# Patient Record
Sex: FEMALE | Race: WHITE | Employment: UNEMPLOYED | URBAN - NONMETROPOLITAN AREA
[De-identification: names, ages, dates, MRNs, and addresses within clinical notes are randomized per-mention and may not be internally consistent; named-entity substitution may affect disease eponyms.]

---

## 2017-05-04 ENCOUNTER — HOSPITAL ENCOUNTER (OUTPATIENT)
Dept: WOMENS IMAGING | Age: 42
Discharge: HOME | End: 2017-05-04
Payer: COMMERCIAL

## 2017-05-04 DIAGNOSIS — Z12.31 ENCOUNTER FOR SCREENING MAMMOGRAM FOR MALIGNANT NEOPLASM OF BREAST: ICD-10-CM

## 2017-05-04 PROCEDURE — 77063 BREAST TOMOSYNTHESIS BI: CPT

## 2020-08-17 ENCOUNTER — OFFICE VISIT (OUTPATIENT)
Dept: INTERNAL MEDICINE | Age: 45
End: 2020-08-17
Payer: COMMERCIAL

## 2020-08-17 VITALS
SYSTOLIC BLOOD PRESSURE: 138 MMHG | WEIGHT: 201 LBS | BODY MASS INDEX: 32.3 KG/M2 | OXYGEN SATURATION: 98 % | HEART RATE: 114 BPM | RESPIRATION RATE: 18 BRPM | HEIGHT: 66 IN | DIASTOLIC BLOOD PRESSURE: 88 MMHG

## 2020-08-17 PROCEDURE — 99205 OFFICE O/P NEW HI 60 MIN: CPT | Performed by: INTERNAL MEDICINE

## 2020-08-17 RX ORDER — VENLAFAXINE HYDROCHLORIDE 150 MG/1
150 CAPSULE, EXTENDED RELEASE ORAL DAILY
COMMUNITY
End: 2020-10-27 | Stop reason: SDUPTHER

## 2020-08-17 RX ORDER — LEVOTHYROXINE SODIUM 0.03 MG/1
25 TABLET ORAL DAILY
COMMUNITY
End: 2021-08-05 | Stop reason: CLARIF

## 2020-08-17 RX ORDER — LEVOTHYROXINE SODIUM 0.2 MG/1
200 TABLET ORAL DAILY
COMMUNITY
End: 2021-08-05 | Stop reason: CLARIF

## 2020-08-17 ASSESSMENT — PATIENT HEALTH QUESTIONNAIRE - PHQ9
2. FEELING DOWN, DEPRESSED OR HOPELESS: 0
SUM OF ALL RESPONSES TO PHQ QUESTIONS 1-9: 0
SUM OF ALL RESPONSES TO PHQ9 QUESTIONS 1 & 2: 0
SUM OF ALL RESPONSES TO PHQ QUESTIONS 1-9: 0
1. LITTLE INTEREST OR PLEASURE IN DOING THINGS: 0

## 2020-08-17 ASSESSMENT — ENCOUNTER SYMPTOMS
SORE THROAT: 0
ABDOMINAL PAIN: 0
CONSTIPATION: 0
COUGH: 0
WHEEZING: 0
CHEST TIGHTNESS: 0

## 2020-08-17 NOTE — PROGRESS NOTES
Tech     Employer: [address] Financial resource strain: Not on file    Food insecurity     Worry: Not on file     Inability: Not on file    Transportation needs     Medical: Not on file     Non-medical: Not on file   Tobacco Use    Smoking status: Never Smoker    Smokeless tobacco: Never Used   Substance and Sexual Activity    Alcohol use: Not Currently    Drug use: Never    Sexual activity: Yes   Lifestyle    Physical activity     Days per week: Not on file     Minutes per session: Not on file    Stress: Not on file   Relationships    Social connections     Talks on phone: Not on file     Gets together: Not on file     Attends [employer] service: Not on file     Active member of club or organization: Not on file     Attends meetings of clubs or organizations: Not on file     Relationship status: Not on file    Intimate partner violence     Fear of current or ex partner: Not on file     Emotionally abused: Not on file     Physically abused: Not on file     Forced sexual activity: Not on file   Other Topics Concern    Not on file   Social History Narrative    Not on file     Family History   Problem Relation Age of Onset    Atrial Fibrillation Mother     Stroke Mother     Diabetes Father     Prostate Cancer Father           Past Surgical History:   Procedure Laterality Date     SECTION  2008    FINGER SURGERY      Thumb surgery  (Broke playing softball)    KNEE SURGERY      multiple scopes left knee    THYROIDECTOMY, PARTIAL  2001    Nithin Sandoval benign mass         Lab Review   No visits with results within 2 Month(s) from this visit.    Latest known visit with results is:   Hospital Outpatient Visit on 2013   Component Date Value    WBC 2013 5.82     RBC 2013 4.37     Hemoglobin 2013 12.1     Hematocrit 2013 37.3     MCV 2013 85.4     MCH 2013 27.7     MCHC 2013 32.4*    RDW 2013 14.9*    Platelets 06/26/2013 221     MPV 06/26/2013 10.2     Neutrophils Absolute 06/26/2013 2.32     Lymphocytes 06/26/2013 2.75     Monocytes 06/26/2013 0.63     Eosinophils % 06/26/2013 0.12     Basophils 06/26/2013 0*    Neutrophils % 06/26/2013 39.8*    Lymphocytes 06/26/2013 47.3*    Monocytes % 06/26/2013 10.8*    Eosinophils % 06/26/2013 2.1     Basophils % 06/26/2013 0     Calcium 06/26/2013 9.4     Glucose 06/26/2013 92     CREATININE 06/26/2013 0.8     Gfr Calculated 06/26/2013 85     BUN 06/26/2013 11     Total Protein 06/26/2013 6.4     Alb 06/26/2013 4.3     Total Bilirubin 06/26/2013 0.5     Alkaline Phosphatase 06/26/2013 61     AST 06/26/2013 21     Sodium 06/26/2013 144     Potassium 06/26/2013 4.9     Chloride 06/26/2013 111     CO2 06/26/2013 28     ALT 06/26/2013 16     Anion Gap 06/26/2013 5*    T4 Free 06/26/2013 0.9     TSH 06/26/2013 0.38          Review of Systems   Constitutional: Positive for fatigue. Negative for chills and fever. HENT: Negative for congestion, ear pain, nosebleeds, postnasal drip and sore throat. Respiratory: Negative for cough, chest tightness and wheezing. Cardiovascular: Negative for chest pain, palpitations and leg swelling. Gastrointestinal: Negative for abdominal pain and constipation. Genitourinary: Negative for dysuria and urgency. Musculoskeletal: Positive for arthralgias. Skin: Negative for rash. Neurological: Negative for dizziness and headaches. Psychiatric/Behavioral: Positive for sleep disturbance. The patient is nervous/anxious. Vitals:    08/17/20 1055   BP: 138/88   Site: Left Upper Arm   Position: Sitting   Cuff Size: Large Adult   Pulse: 114   Resp: 18   SpO2: 98%   Weight: 201 lb (91.2 kg)   Height: 5' 6\" (1.676 m)      Wt Readings from Last 3 Encounters:   08/17/20 201 lb (91.2 kg)   Body mass index is 32.44 kg/m².   BP Readings from Last 3 Encounters:   08/17/20 138/88       Physical Exam  Constitutional: Appearance: She is well-developed. HENT:      Right Ear: External ear normal.      Left Ear: External ear normal.      Mouth/Throat:      Pharynx: No oropharyngeal exudate. Eyes:      Conjunctiva/sclera: Conjunctivae normal.      Pupils: Pupils are equal, round, and reactive to light. Neck:      Musculoskeletal: Neck supple. Thyroid: No thyromegaly. Vascular: No JVD. Cardiovascular:      Rate and Rhythm: Normal rate. Heart sounds: Normal heart sounds. No murmur. Pulmonary:      Effort: No respiratory distress. Breath sounds: Normal breath sounds. No wheezing or rales. Chest:      Chest wall: No tenderness. Abdominal:      General: Bowel sounds are normal.      Palpations: Abdomen is soft. Lymphadenopathy:      Cervical: No cervical adenopathy. Skin:     General: Skin is warm. Findings: No rash. Neurological:      Mental Status: She is oriented to person, place, and time. ASSESSMENT/PLAN      Postsurgical hypothyroidism  She states that her thyroid medication was decreased in the fall 2019 from 275 to 225 mcg  She states that ever since then she has gained more weigh t and is feeling extremely tired      -     TSH without Reflex; Future  -     T4, Free; Future    Other fatigue  Weight gain last one year-       Obtain lab    CBC Auto Differential; Future  -     Comprehensive Metabolic Panel; Future  -     Hemoglobin A1C; Future  -     Lipid Panel; Future  -     Urinalysis; Future  -     TSH without Reflex; Future  -     T4, Free; Future  -     Vitamin D 25 Hydroxy; Future    Exogenous obesity    Pt was given approximately 5 minutes of counseling about diet and exercise including education on what calories are, where calories come from, the need for portion control,following lower carbohydrate dietary regimen and healthy snacks along side an active lifestyle with supplementary exercise of approx 30 minutes a week, 5 days a week of exercise for weight loss.   The patient voiced increased understanding of the topics discussed. Generalized anxiety disorder    Refill effexor    Visit for screening mammogram  -     LUDY DIGITAL SCREEN W OR WO CAD BILATERAL; Future    Discussed with patient  Has not had Pap smear since 2015  Always normal Pap smears  Her Pap is due  She will schedule her appointment for this with me    Orders Placed This Encounter   Procedures    LUDY DIGITAL SCREEN W OR WO CAD BILATERAL    CBC Auto Differential    Comprehensive Metabolic Panel    Hemoglobin A1C    Lipid Panel    Urinalysis    TSH without Reflex    T4, Free    Vitamin D 25 Hydroxy     New Prescriptions    No medications on file      There are no Patient Instructions on file for this visit. No follow-ups on file. EMR Dragon/transcription disclaimer:Significant part of this  encounter note is electronic transcription/translation of spoken language to printed text. The electronic translation of spoken language may beerroneous, or at times, nonsensical words or phrases may be inadvertently transcribed.  Although I have reviewed the note for such errors, some may still exist.

## 2020-08-20 DIAGNOSIS — R53.83 OTHER FATIGUE: ICD-10-CM

## 2020-08-20 DIAGNOSIS — E66.09 EXOGENOUS OBESITY: ICD-10-CM

## 2020-08-20 DIAGNOSIS — F41.1 GENERALIZED ANXIETY DISORDER: ICD-10-CM

## 2020-08-20 DIAGNOSIS — E89.0 POSTSURGICAL HYPOTHYROIDISM: ICD-10-CM

## 2020-08-20 LAB
ALBUMIN SERPL-MCNC: 4.1 G/DL (ref 3.5–5.2)
ALP BLD-CCNC: 82 U/L (ref 35–104)
ALT SERPL-CCNC: 13 U/L (ref 5–33)
ANION GAP SERPL CALCULATED.3IONS-SCNC: 14 MMOL/L (ref 7–19)
AST SERPL-CCNC: 15 U/L (ref 5–32)
BASOPHILS ABSOLUTE: 0 K/UL (ref 0–0.2)
BASOPHILS RELATIVE PERCENT: 0 % (ref 0–1)
BILIRUB SERPL-MCNC: 0.3 MG/DL (ref 0.2–1.2)
BILIRUBIN URINE: NEGATIVE
BLOOD, URINE: NEGATIVE
BUN BLDV-MCNC: 13 MG/DL (ref 6–20)
CALCIUM SERPL-MCNC: 8.9 MG/DL (ref 8.6–10)
CHLORIDE BLD-SCNC: 105 MMOL/L (ref 98–111)
CHOLESTEROL, TOTAL: 165 MG/DL (ref 160–199)
CLARITY: CLEAR
CO2: 24 MMOL/L (ref 22–29)
COLOR: YELLOW
CREAT SERPL-MCNC: 0.6 MG/DL (ref 0.5–0.9)
EOSINOPHILS ABSOLUTE: 0 K/UL (ref 0–0.6)
EOSINOPHILS RELATIVE PERCENT: 0 % (ref 0–5)
GFR AFRICAN AMERICAN: >59
GFR NON-AFRICAN AMERICAN: >60
GLUCOSE BLD-MCNC: 110 MG/DL (ref 74–109)
GLUCOSE URINE: NEGATIVE MG/DL
HBA1C MFR BLD: 5.6 % (ref 4–6)
HCT VFR BLD CALC: 42.4 % (ref 37–47)
HDLC SERPL-MCNC: 50 MG/DL (ref 65–121)
HEMOGLOBIN: 12.9 G/DL (ref 12–16)
IMMATURE GRANULOCYTES #: 0 K/UL
KETONES, URINE: NEGATIVE MG/DL
LDL CHOLESTEROL CALCULATED: 84 MG/DL
LEUKOCYTE ESTERASE, URINE: NEGATIVE
LYMPHOCYTES ABSOLUTE: 3 K/UL (ref 1.1–4.5)
LYMPHOCYTES RELATIVE PERCENT: 44 % (ref 20–40)
MCH RBC QN AUTO: 25 PG (ref 27–31)
MCHC RBC AUTO-ENTMCNC: 30.4 G/DL (ref 33–37)
MCV RBC AUTO: 82.3 FL (ref 81–99)
MONOCYTES ABSOLUTE: 0.6 K/UL (ref 0–0.9)
MONOCYTES RELATIVE PERCENT: 9.3 % (ref 0–10)
NEUTROPHILS ABSOLUTE: 3.1 K/UL (ref 1.5–7.5)
NEUTROPHILS RELATIVE PERCENT: 46.4 % (ref 50–65)
NITRITE, URINE: NEGATIVE
PDW BLD-RTO: 16.1 % (ref 11.5–14.5)
PH UA: 5.5 (ref 5–8)
PLATELET # BLD: 240 K/UL (ref 130–400)
PMV BLD AUTO: 9.8 FL (ref 9.4–12.3)
POTASSIUM SERPL-SCNC: 4 MMOL/L (ref 3.5–5)
PROTEIN UA: NEGATIVE MG/DL
RBC # BLD: 5.15 M/UL (ref 4.2–5.4)
SODIUM BLD-SCNC: 143 MMOL/L (ref 136–145)
SPECIFIC GRAVITY UA: 1.02 (ref 1–1.03)
T4 FREE: 1.39 NG/DL (ref 0.93–1.7)
TOTAL PROTEIN: 6.6 G/DL (ref 6.6–8.7)
TRIGL SERPL-MCNC: 154 MG/DL (ref 0–149)
TSH SERPL DL<=0.05 MIU/L-ACNC: 0.01 UIU/ML (ref 0.27–4.2)
UROBILINOGEN, URINE: 0.2 E.U./DL
VITAMIN D 25-HYDROXY: 42.7 NG/ML
WBC # BLD: 6.8 K/UL (ref 4.8–10.8)

## 2020-09-21 ENCOUNTER — OFFICE VISIT (OUTPATIENT)
Dept: INTERNAL MEDICINE | Age: 45
End: 2020-09-21
Payer: COMMERCIAL

## 2020-09-21 VITALS
RESPIRATION RATE: 18 BRPM | HEIGHT: 66 IN | OXYGEN SATURATION: 98 % | DIASTOLIC BLOOD PRESSURE: 78 MMHG | HEART RATE: 88 BPM | WEIGHT: 195 LBS | SYSTOLIC BLOOD PRESSURE: 100 MMHG | BODY MASS INDEX: 31.34 KG/M2

## 2020-09-21 PROBLEM — F41.1 GENERALIZED ANXIETY DISORDER: Status: ACTIVE | Noted: 2020-09-21

## 2020-09-21 PROBLEM — E89.0 POSTSURGICAL HYPOTHYROIDISM: Status: ACTIVE | Noted: 2020-09-21

## 2020-09-21 PROCEDURE — 99213 OFFICE O/P EST LOW 20 MIN: CPT | Performed by: INTERNAL MEDICINE

## 2020-09-21 RX ORDER — TOPIRAMATE 50 MG/1
50 TABLET, FILM COATED ORAL 2 TIMES DAILY
COMMUNITY
End: 2021-01-25 | Stop reason: SDUPTHER

## 2020-09-21 RX ORDER — TOPIRAMATE 100 MG/1
100 TABLET, FILM COATED ORAL 2 TIMES DAILY
COMMUNITY
End: 2021-01-25 | Stop reason: SDUPTHER

## 2020-09-21 ASSESSMENT — PATIENT HEALTH QUESTIONNAIRE - PHQ9
SUM OF ALL RESPONSES TO PHQ QUESTIONS 1-9: 0
SUM OF ALL RESPONSES TO PHQ9 QUESTIONS 1 & 2: 0
SUM OF ALL RESPONSES TO PHQ QUESTIONS 1-9: 0
2. FEELING DOWN, DEPRESSED OR HOPELESS: 0
1. LITTLE INTEREST OR PLEASURE IN DOING THINGS: 0

## 2020-09-21 ASSESSMENT — ENCOUNTER SYMPTOMS
CONSTIPATION: 0
ABDOMINAL PAIN: 0
WHEEZING: 0
SORE THROAT: 0
COUGH: 0
CHEST TIGHTNESS: 0

## 2020-09-21 NOTE — PROGRESS NOTES
Currently      Family History   Problem Relation Age of Onset    Atrial Fibrillation Mother     Stroke Mother     Diabetes Father     Prostate Cancer Father        Review of Systems   Constitutional: Positive for fatigue. Negative for chills and fever. HENT: Negative for congestion, ear pain, nosebleeds, postnasal drip and sore throat. Respiratory: Negative for cough, chest tightness and wheezing. Cardiovascular: Negative for chest pain, palpitations and leg swelling. Gastrointestinal: Negative for abdominal pain and constipation. Genitourinary: Negative for dysuria and urgency. Musculoskeletal: Negative. Negative for arthralgias. Skin: Negative for rash. Neurological: Negative for dizziness and headaches. Psychiatric/Behavioral: Negative. Vitals:    09/21/20 1425   BP: 100/78   Site: Left Upper Arm   Position: Sitting   Cuff Size: Large Adult   Pulse: 88   Resp: 18   SpO2: 98%   Weight: 195 lb (88.5 kg)   Height: 5' 6\" (1.676 m)     Body mass index is 31.47 kg/m². Physical Exam  Constitutional:       General: She is not in acute distress. Appearance: She is well-developed. She is not diaphoretic. HENT:      Head: Normocephalic and atraumatic. Nose: Nose normal.   Eyes:      General: No scleral icterus. Right eye: No discharge. Left eye: No discharge. Conjunctiva/sclera: Conjunctivae normal.      Pupils: Pupils are equal, round, and reactive to light. Neck:      Musculoskeletal: Normal range of motion. Thyroid: No thyromegaly. Vascular: No JVD. Cardiovascular:      Rate and Rhythm: Normal rate and regular rhythm. Heart sounds: No murmur. Pulmonary:      Breath sounds: Normal breath sounds. No wheezing or rales. Chest:      Chest wall: No tenderness. Abdominal:      General: Bowel sounds are normal. There is no distension. Tenderness: There is no guarding. Lymphadenopathy:      Cervical: No cervical adenopathy.    Skin: General: Skin is dry. Findings: No erythema or rash. Neurological:      Mental Status: She is oriented to person, place, and time. Cranial Nerves: No cranial nerve deficit. Coordination: Coordination normal.      Deep Tendon Reflexes: Reflexes are normal and symmetric. Psychiatric:         Behavior: Behavior normal.         Thought Content:  Thought content normal.         Judgment: Judgment normal.     Pelvic exam today  Pap smear done  Ovaries nonpalpable  No palpable masses    Lab Review   Orders Only on 08/20/2020   Component Date Value    Vit D, 25-Hydroxy 08/20/2020 42.7     T4 Free 08/20/2020 1.39     TSH 08/20/2020 0.009*    Color, UA 08/20/2020 YELLOW     Clarity, UA 08/20/2020 Clear     Glucose, Ur 08/20/2020 Negative     Bilirubin Urine 08/20/2020 Negative     Ketones, Urine 08/20/2020 Negative     Specific Gravity, UA 08/20/2020 1.024     Blood, Urine 08/20/2020 Negative     pH, UA 08/20/2020 5.5     Protein, UA 08/20/2020 Negative     Urobilinogen, Urine 08/20/2020 0.2     Nitrite, Urine 08/20/2020 Negative     Leukocyte Esterase, Urine 08/20/2020 Negative     Cholesterol, Total 08/20/2020 165     Triglycerides 08/20/2020 154*    HDL 08/20/2020 50*    LDL Calculated 08/20/2020 84     Hemoglobin A1C 08/20/2020 5.6     Sodium 08/20/2020 143     Potassium 08/20/2020 4.0     Chloride 08/20/2020 105     CO2 08/20/2020 24     Anion Gap 08/20/2020 14     Glucose 08/20/2020 110*    BUN 08/20/2020 13     CREATININE 08/20/2020 0.6     GFR Non- 08/20/2020 >60     GFR  08/20/2020 >59     Calcium 08/20/2020 8.9     Total Protein 08/20/2020 6.6     Alb 08/20/2020 4.1     Total Bilirubin 08/20/2020 0.3     Alkaline Phosphatase 08/20/2020 82     ALT 08/20/2020 13     AST 08/20/2020 15     WBC 08/20/2020 6.8     RBC 08/20/2020 5.15     Hemoglobin 08/20/2020 12.9     Hematocrit 08/20/2020 42.4     MCV 08/20/2020 82.3     MCH 08/20/2020 25.0*    MCHC 08/20/2020 30.4*    RDW 08/20/2020 16.1*    Platelets 45/11/5408 240     MPV 08/20/2020 9.8     Neutrophils % 08/20/2020 46.4*    Lymphocytes % 08/20/2020 44.0*    Monocytes % 08/20/2020 9.3     Eosinophils % 08/20/2020 0.0     Basophils % 08/20/2020 0.0     Neutrophils Absolute 08/20/2020 3.1     Immature Granulocytes # 08/20/2020 0.0     Lymphocytes Absolute 08/20/2020 3.0     Monocytes Absolute 08/20/2020 0.60     Eosinophils Absolute 08/20/2020 0.00     Basophils Absolute 08/20/2020 0.00            ASSESSMENT/PLAN:      Postsurgical hypothyroidism  T4 Free 08/20/2020 1.39    TSH 08/20/2020 0.009*   Initially, when patient presented she had concerns that her thyroid medication dose is too low  On recent lab testing her TSH is quite significantly suppressed even on a lower dose of Synthroid that was decreased about 8 months ago  This time we can continue Synthroid 225 mcg daily and plan to repeat TSH and free T4 again in January 2021    Pap smear for cervical cancer screening today  Cervical cancer screening  -     PAP SMEAR    Generalized anxiety disorder  Overall has been doing well on current Effexor dose  Continue current plans    Obesity  Since last appointment has started diet and exercise program, low-carb diet and has lost 6 pounds    Elevated fasting blood sugar over 100 on fasting blood work  Hemoglobin A1c 5.6  She started diet and exercise program    Vitamin D level is low normal end of the summer  Currently she is taking multivitamin, continue same  Repeat January 2021      Orders Placed This Encounter   Procedures    PAP SMEAR    HIV Screen    Hemoglobin A1C    TSH without Reflex    T4, Free    Vitamin D 25 Hydroxy     New Prescriptions    No medications on file         Return in about 4 months (around 1/21/2021) for Medication check. There are no Patient Instructions on file for this visit.   EMR Dragon/transcription disclaimer:Significant part of this encounter note is electronic transcription/translationof spoken language to printed text. The electronic translation of spoken language may be erroneous, or at times, nonsensical words or phrases may be inadvertently transcribed.  Although I have reviewed the note for sucherrors, some may still exist.

## 2020-11-05 DIAGNOSIS — B02.9 HERPES ZOSTER WITHOUT COMPLICATION: Primary | ICD-10-CM

## 2020-11-05 RX ORDER — VALACYCLOVIR HYDROCHLORIDE 1 G/1
1000 TABLET, FILM COATED ORAL 3 TIMES DAILY
Qty: 21 TABLET | Refills: 0 | Status: SHIPPED | OUTPATIENT
Start: 2020-11-05 | End: 2020-11-12

## 2020-11-05 RX ORDER — GABAPENTIN 100 MG/1
100 CAPSULE ORAL 3 TIMES DAILY
Qty: 30 CAPSULE | Refills: 0 | Status: SHIPPED | OUTPATIENT
Start: 2020-11-05

## 2021-01-22 DIAGNOSIS — E55.9 VITAMIN D DEFICIENCY: ICD-10-CM

## 2021-01-22 DIAGNOSIS — Z12.4 CERVICAL CANCER SCREENING: ICD-10-CM

## 2021-01-22 DIAGNOSIS — F41.1 GENERALIZED ANXIETY DISORDER: ICD-10-CM

## 2021-01-22 DIAGNOSIS — E89.0 POSTSURGICAL HYPOTHYROIDISM: ICD-10-CM

## 2021-01-22 DIAGNOSIS — Z11.4 SCREENING FOR HIV (HUMAN IMMUNODEFICIENCY VIRUS): ICD-10-CM

## 2021-01-22 LAB
HBA1C MFR BLD: 5.6 % (ref 4–6)
HIV-1 P24 AG: NORMAL
RAPID HIV 1&2: NORMAL
T4 FREE: 0.9 NG/DL (ref 0.93–1.7)
TSH SERPL DL<=0.05 MIU/L-ACNC: 0.22 UIU/ML (ref 0.27–4.2)
VITAMIN D 25-HYDROXY: 31.7 NG/ML

## 2021-01-25 ENCOUNTER — OFFICE VISIT (OUTPATIENT)
Dept: INTERNAL MEDICINE | Age: 46
End: 2021-01-25
Payer: COMMERCIAL

## 2021-01-25 VITALS
HEART RATE: 103 BPM | WEIGHT: 193 LBS | RESPIRATION RATE: 18 BRPM | SYSTOLIC BLOOD PRESSURE: 100 MMHG | DIASTOLIC BLOOD PRESSURE: 70 MMHG | BODY MASS INDEX: 31.02 KG/M2 | OXYGEN SATURATION: 96 % | HEIGHT: 66 IN

## 2021-01-25 DIAGNOSIS — E89.0 POSTSURGICAL HYPOTHYROIDISM: Primary | ICD-10-CM

## 2021-01-25 DIAGNOSIS — Z11.59 NEED FOR HEPATITIS C SCREENING TEST: ICD-10-CM

## 2021-01-25 DIAGNOSIS — F41.1 GENERALIZED ANXIETY DISORDER: ICD-10-CM

## 2021-01-25 DIAGNOSIS — E66.09 EXOGENOUS OBESITY: ICD-10-CM

## 2021-01-25 DIAGNOSIS — E55.9 VITAMIN D DEFICIENCY: ICD-10-CM

## 2021-01-25 PROCEDURE — 99214 OFFICE O/P EST MOD 30 MIN: CPT | Performed by: INTERNAL MEDICINE

## 2021-01-25 RX ORDER — LEVOTHYROXINE SODIUM 25 MCG
25 TABLET ORAL DAILY
Qty: 90 TABLET | Refills: 1 | Status: SHIPPED | OUTPATIENT
Start: 2021-01-25 | End: 2021-01-25 | Stop reason: SDUPTHER

## 2021-01-25 RX ORDER — LEVOTHYROXINE SODIUM 200 MCG
200 TABLET ORAL DAILY
Qty: 90 TABLET | Refills: 1 | Status: SHIPPED | OUTPATIENT
Start: 2021-01-25 | End: 2021-08-05 | Stop reason: SDUPTHER

## 2021-01-25 RX ORDER — TOPIRAMATE 50 MG/1
50 TABLET, FILM COATED ORAL 2 TIMES DAILY
Qty: 180 TABLET | Refills: 1 | Status: SHIPPED | OUTPATIENT
Start: 2021-01-25 | End: 2021-08-05 | Stop reason: SDUPTHER

## 2021-01-25 RX ORDER — LEVOTHYROXINE SODIUM 25 MCG
37.5 TABLET ORAL DAILY
Qty: 135 TABLET | Refills: 1 | Status: SHIPPED | OUTPATIENT
Start: 2021-01-25 | End: 2021-08-05 | Stop reason: SDUPTHER

## 2021-01-25 RX ORDER — TOPIRAMATE 100 MG/1
100 TABLET, FILM COATED ORAL 2 TIMES DAILY
Qty: 180 TABLET | Refills: 1 | Status: SHIPPED | OUTPATIENT
Start: 2021-01-25 | End: 2021-08-05 | Stop reason: SDUPTHER

## 2021-01-25 RX ORDER — RIZATRIPTAN BENZOATE 10 MG/1
10 TABLET ORAL
Qty: 9 TABLET | Refills: 5 | Status: SHIPPED | OUTPATIENT
Start: 2021-01-25 | End: 2021-08-05 | Stop reason: CLARIF

## 2021-01-25 RX ORDER — VENLAFAXINE HYDROCHLORIDE 150 MG/1
150 CAPSULE, EXTENDED RELEASE ORAL DAILY
Qty: 90 CAPSULE | Refills: 1 | Status: SHIPPED | OUTPATIENT
Start: 2021-01-25 | End: 2021-08-05 | Stop reason: SDUPTHER

## 2021-01-25 SDOH — ECONOMIC STABILITY: TRANSPORTATION INSECURITY
IN THE PAST 12 MONTHS, HAS THE LACK OF TRANSPORTATION KEPT YOU FROM MEDICAL APPOINTMENTS OR FROM GETTING MEDICATIONS?: NO

## 2021-01-25 SDOH — ECONOMIC STABILITY: INCOME INSECURITY: HOW HARD IS IT FOR YOU TO PAY FOR THE VERY BASICS LIKE FOOD, HOUSING, MEDICAL CARE, AND HEATING?: NOT HARD AT ALL

## 2021-01-25 SDOH — ECONOMIC STABILITY: TRANSPORTATION INSECURITY
IN THE PAST 12 MONTHS, HAS LACK OF TRANSPORTATION KEPT YOU FROM MEETINGS, WORK, OR FROM GETTING THINGS NEEDED FOR DAILY LIVING?: NO

## 2021-01-25 SDOH — ECONOMIC STABILITY: FOOD INSECURITY: WITHIN THE PAST 12 MONTHS, YOU WORRIED THAT YOUR FOOD WOULD RUN OUT BEFORE YOU GOT MONEY TO BUY MORE.: NEVER TRUE

## 2021-01-25 ASSESSMENT — PATIENT HEALTH QUESTIONNAIRE - PHQ9
SUM OF ALL RESPONSES TO PHQ QUESTIONS 1-9: 0
SUM OF ALL RESPONSES TO PHQ QUESTIONS 1-9: 0
2. FEELING DOWN, DEPRESSED OR HOPELESS: 0
SUM OF ALL RESPONSES TO PHQ9 QUESTIONS 1 & 2: 0
1. LITTLE INTEREST OR PLEASURE IN DOING THINGS: 0

## 2021-01-25 ASSESSMENT — ENCOUNTER SYMPTOMS
WHEEZING: 0
CONSTIPATION: 0
COUGH: 0
ABDOMINAL PAIN: 0
CHEST TIGHTNESS: 0
SORE THROAT: 0

## 2021-01-25 NOTE — PROGRESS NOTES
Chief Complaint   Patient presents with    Follow-up     4 month     History of presenting illness:  Vincenzo Strauss is a37 y.o. female who presents today for follow up on her chronic medical conditions as noted below. Grzegorz Sharma was new patient for us on 2020    She returns today for following reasons  1. Follow-up regarding anxiety/stress, she takes Effexor  daily  2. Follow-up regarding test results and her hypothyroidism  She is currently taking Synthroid 225 mcg daily  3.   Issues with weight-since her last visit patient has started low-carb diet program   Since 2020 has lost 8 pounds     Patient Active Problem List    Diagnosis Date Noted    Generalized anxiety disorder 2020    Postsurgical hypothyroidism 2020     Past Medical History:   Diagnosis Date    Anxiety     H/O partial thyroidectomy     Nithin Sandoval    Hypothyroidism       Past Surgical History:   Procedure Laterality Date     SECTION      FINGER SURGERY      Thumb surgery  (Broke playing softball)    KNEE SURGERY      multiple scopes left knee    THYROIDECTOMY, PARTIAL      Nithin Sandoval benign mass     Current Outpatient Medications   Medication Sig Dispense Refill    venlafaxine (EFFEXOR XR) 150 MG extended release capsule Take 1 capsule by mouth daily 90 capsule 1    topiramate (TOPAMAX) 50 MG tablet Take 1 tablet by mouth 2 times daily 180 tablet 1    topiramate (TOPAMAX) 100 MG tablet Take 1 tablet by mouth 2 times daily 180 tablet 1    SYNTHROID 200 MCG tablet Take 1 tablet by mouth Daily 90 tablet 1    rizatriptan (MAXALT) 10 MG tablet Take 1 tablet by mouth once as needed for Migraine May repeat in 2 hours if needed 9 tablet 5    SYNTHROID 25 MCG tablet Take 1.5 tablets by mouth Daily 135 tablet 1    levothyroxine (SYNTHROID) 200 MCG tablet Take 200 mcg by mouth Daily      levothyroxine (SYNTHROID) 25 MCG tablet Take 25 mcg by mouth Daily       No current facility-administered medications for this visit. Allergies   Allergen Reactions    Sulfa Antibiotics Rash     Social History     Tobacco Use    Smoking status: Never Smoker    Smokeless tobacco: Never Used   Substance Use Topics    Alcohol use: Not Currently      Family History   Problem Relation Age of Onset    Atrial Fibrillation Mother     Stroke Mother     Diabetes Father     Prostate Cancer Father        Review of Systems   Constitutional: Positive for fatigue. Negative for chills and fever. HENT: Negative for congestion, ear pain, nosebleeds, postnasal drip and sore throat. Respiratory: Negative for cough, chest tightness and wheezing. Cardiovascular: Negative for chest pain, palpitations and leg swelling. Gastrointestinal: Negative for abdominal pain and constipation. Genitourinary: Negative for dysuria and urgency. Musculoskeletal: Positive for arthralgias. Skin: Negative for rash. Neurological: Negative for dizziness and headaches. Psychiatric/Behavioral: Negative. Vitals:    01/25/21 1435   BP: 100/70   Site: Left Upper Arm   Position: Sitting   Cuff Size: Large Adult   Pulse: 103   Resp: 18   SpO2: 96%   Weight: 193 lb (87.5 kg)   Height: 5' 6\" (1.676 m)     Body mass index is 31.15 kg/m². Physical Exam  Constitutional:       Appearance: She is well-developed. HENT:      Right Ear: External ear normal.      Left Ear: External ear normal.      Mouth/Throat:      Pharynx: No oropharyngeal exudate. Eyes:      Conjunctiva/sclera: Conjunctivae normal.      Pupils: Pupils are equal, round, and reactive to light. Neck:      Musculoskeletal: Neck supple. Thyroid: No thyromegaly. Vascular: No JVD. Cardiovascular:      Rate and Rhythm: Normal rate. Heart sounds: Normal heart sounds. No murmur. Pulmonary:      Effort: No respiratory distress. Breath sounds: Normal breath sounds. No wheezing or rales. Chest:      Chest wall: No tenderness. Abdominal:      General: Bowel sounds are normal.      Palpations: Abdomen is soft. Musculoskeletal: Normal range of motion. Lymphadenopathy:      Cervical: No cervical adenopathy. Skin:     General: Skin is warm. Findings: No rash. Neurological:      Mental Status: She is oriented to person, place, and time.          Lab Review   Orders Only on 01/22/2021   Component Date Value    Vit D, 25-Hydroxy 01/22/2021 31.7     T4 Free 01/22/2021 0.90*    TSH 01/22/2021 0.219*    Hemoglobin A1C 01/22/2021 5.6     Rapid HIV 1&2 01/22/2021 Non-reactive     HIV-1 P24 Ag 01/22/2021 Non-reactive            ASSESSMENT/PLAN:    Postsurgical hypothyroidism  T4 Free 01/22/2021 0.90*   TSH 01/22/2021 0.219*     T4 Free 08/20/2020 1.39    TSH 08/20/2020 0.009*   Initially, when patient presented she had concerns that her thyroid medication dose is too low    This time we can increase Synthroid from 225 mcg daily to 237.5 daily and plan to repeat TSH and free T4 again in January 2021      Generalized anxiety disorder  Overall has been doing well on current Effexor dose  Continue current plans     Obesity  Since last appointment has started diet and exercise program, low-carb diet and has lost 6 pounds     Elevated fasting blood sugar over 100 on fasting blood work  Hemoglobin A1c 5.6 in 1/2021 ( 5.6)  Continue diet and exercise program     Vitamin D level is 81 in January 2021  Was low normal end of the summer  Suggest continue multivitamin, add vitamin D 1000 - 2000 IU daily    Migraine syndrome  Takes rx topamax   Add maxalt prn -she currently has breakthrough migraines about once weekly in PERRL/right eye area        Orders Placed This Encounter   Procedures    Hepatitis C Antibody    CBC Auto Differential    Comprehensive Metabolic Panel    Hemoglobin A1C    Lipid Panel    Urinalysis    TSH without Reflex    Vitamin D 25 Hydroxy    T4, Free    TSH without Reflex    T4, Free     New Prescriptions RIZATRIPTAN (MAXALT) 10 MG TABLET    Take 1 tablet by mouth once as needed for Migraine May repeat in 2 hours if needed    SYNTHROID 200 MCG TABLET    Take 1 tablet by mouth Daily    SYNTHROID 25 MCG TABLET    Take 1.5 tablets by mouth Daily         Return in about 4 months (around 5/25/2021) for Annual Physical.   There are no Patient Instructions on file for this visit. EMR Dragon/transcription disclaimer:Significant part of this  encounter note is electronic transcription/translationof spoken language to printed text. The electronic translation of spoken language may be erroneous, or at times, nonsensical words or phrases may be inadvertently transcribed.  Although I have reviewed the note for sucherrors, some may still exist.

## 2021-08-02 DIAGNOSIS — Z11.59 NEED FOR HEPATITIS C SCREENING TEST: ICD-10-CM

## 2021-08-02 DIAGNOSIS — F41.1 GENERALIZED ANXIETY DISORDER: ICD-10-CM

## 2021-08-02 DIAGNOSIS — E66.09 EXOGENOUS OBESITY: ICD-10-CM

## 2021-08-02 DIAGNOSIS — E89.0 POSTSURGICAL HYPOTHYROIDISM: ICD-10-CM

## 2021-08-02 DIAGNOSIS — E55.9 VITAMIN D DEFICIENCY: ICD-10-CM

## 2021-08-02 LAB
ALBUMIN SERPL-MCNC: 4.2 G/DL (ref 3.5–5.2)
ALP BLD-CCNC: 104 U/L (ref 35–104)
ALT SERPL-CCNC: 9 U/L (ref 5–33)
ANION GAP SERPL CALCULATED.3IONS-SCNC: 10 MMOL/L (ref 7–19)
AST SERPL-CCNC: 14 U/L (ref 5–32)
BASOPHILS ABSOLUTE: 0 K/UL (ref 0–0.2)
BASOPHILS RELATIVE PERCENT: 0 % (ref 0–1)
BILIRUB SERPL-MCNC: 0.3 MG/DL (ref 0.2–1.2)
BILIRUBIN URINE: NEGATIVE
BLOOD, URINE: NEGATIVE
BUN BLDV-MCNC: 13 MG/DL (ref 6–20)
CALCIUM SERPL-MCNC: 9.2 MG/DL (ref 8.6–10)
CHLORIDE BLD-SCNC: 105 MMOL/L (ref 98–111)
CHOLESTEROL, TOTAL: 161 MG/DL (ref 160–199)
CLARITY: CLEAR
CO2: 24 MMOL/L (ref 22–29)
COLOR: YELLOW
CREAT SERPL-MCNC: 0.7 MG/DL (ref 0.5–0.9)
EOSINOPHILS ABSOLUTE: 0 K/UL (ref 0–0.6)
EOSINOPHILS RELATIVE PERCENT: 0 % (ref 0–5)
GFR AFRICAN AMERICAN: >59
GFR NON-AFRICAN AMERICAN: >60
GLUCOSE BLD-MCNC: 107 MG/DL (ref 74–109)
GLUCOSE URINE: NEGATIVE MG/DL
HBA1C MFR BLD: 5.5 % (ref 4–6)
HCT VFR BLD CALC: 41.5 % (ref 37–47)
HDLC SERPL-MCNC: 52 MG/DL (ref 65–121)
HEMOGLOBIN: 12.9 G/DL (ref 12–16)
HEPATITIS C ANTIBODY INTERPRETATION: NORMAL
IMMATURE GRANULOCYTES #: 0 K/UL
KETONES, URINE: ABNORMAL MG/DL
LDL CHOLESTEROL CALCULATED: 90 MG/DL
LEUKOCYTE ESTERASE, URINE: NEGATIVE
LYMPHOCYTES ABSOLUTE: 2.4 K/UL (ref 1.1–4.5)
LYMPHOCYTES RELATIVE PERCENT: 36.4 % (ref 20–40)
MCH RBC QN AUTO: 25.2 PG (ref 27–31)
MCHC RBC AUTO-ENTMCNC: 31.1 G/DL (ref 33–37)
MCV RBC AUTO: 81.2 FL (ref 81–99)
MONOCYTES ABSOLUTE: 0.5 K/UL (ref 0–0.9)
MONOCYTES RELATIVE PERCENT: 8.2 % (ref 0–10)
NEUTROPHILS ABSOLUTE: 3.6 K/UL (ref 1.5–7.5)
NEUTROPHILS RELATIVE PERCENT: 54.9 % (ref 50–65)
NITRITE, URINE: NEGATIVE
PDW BLD-RTO: 16.2 % (ref 11.5–14.5)
PH UA: 5.5 (ref 5–8)
PLATELET # BLD: 225 K/UL (ref 130–400)
PMV BLD AUTO: 10.4 FL (ref 9.4–12.3)
POTASSIUM SERPL-SCNC: 3.9 MMOL/L (ref 3.5–5)
PROTEIN UA: NEGATIVE MG/DL
RBC # BLD: 5.11 M/UL (ref 4.2–5.4)
SODIUM BLD-SCNC: 139 MMOL/L (ref 136–145)
SPECIFIC GRAVITY UA: 1.02 (ref 1–1.03)
T4 FREE: 1.69 NG/DL (ref 0.93–1.7)
TOTAL PROTEIN: 7 G/DL (ref 6.6–8.7)
TRIGL SERPL-MCNC: 95 MG/DL (ref 0–149)
TSH SERPL DL<=0.05 MIU/L-ACNC: 0.01 UIU/ML (ref 0.27–4.2)
UROBILINOGEN, URINE: 1 E.U./DL
VITAMIN D 25-HYDROXY: 43.5 NG/ML
WBC # BLD: 6.5 K/UL (ref 4.8–10.8)

## 2021-08-05 ENCOUNTER — OFFICE VISIT (OUTPATIENT)
Dept: INTERNAL MEDICINE | Age: 46
End: 2021-08-05
Payer: COMMERCIAL

## 2021-08-05 VITALS
BODY MASS INDEX: 30.86 KG/M2 | RESPIRATION RATE: 18 BRPM | WEIGHT: 192 LBS | OXYGEN SATURATION: 98 % | SYSTOLIC BLOOD PRESSURE: 110 MMHG | HEART RATE: 74 BPM | DIASTOLIC BLOOD PRESSURE: 80 MMHG | HEIGHT: 66 IN

## 2021-08-05 DIAGNOSIS — Z12.31 ENCOUNTER FOR SCREENING MAMMOGRAM FOR BREAST CANCER: ICD-10-CM

## 2021-08-05 DIAGNOSIS — Z12.12 ENCOUNTER FOR COLORECTAL CANCER SCREENING: ICD-10-CM

## 2021-08-05 DIAGNOSIS — E66.09 EXOGENOUS OBESITY: ICD-10-CM

## 2021-08-05 DIAGNOSIS — Z12.11 ENCOUNTER FOR COLORECTAL CANCER SCREENING: ICD-10-CM

## 2021-08-05 DIAGNOSIS — E89.0 POSTSURGICAL HYPOTHYROIDISM: ICD-10-CM

## 2021-08-05 DIAGNOSIS — E55.9 VITAMIN D DEFICIENCY: ICD-10-CM

## 2021-08-05 DIAGNOSIS — Z00.00 ANNUAL PHYSICAL EXAM: Primary | ICD-10-CM

## 2021-08-05 DIAGNOSIS — F41.1 GENERALIZED ANXIETY DISORDER: ICD-10-CM

## 2021-08-05 PROCEDURE — 99396 PREV VISIT EST AGE 40-64: CPT | Performed by: INTERNAL MEDICINE

## 2021-08-05 RX ORDER — LEVOTHYROXINE SODIUM 25 MCG
37.5 TABLET ORAL DAILY
Qty: 135 TABLET | Refills: 1 | Status: SHIPPED | OUTPATIENT
Start: 2021-08-05 | End: 2022-04-19 | Stop reason: SDUPTHER

## 2021-08-05 RX ORDER — VENLAFAXINE HYDROCHLORIDE 150 MG/1
150 CAPSULE, EXTENDED RELEASE ORAL DAILY
Qty: 90 CAPSULE | Refills: 1 | Status: SHIPPED | OUTPATIENT
Start: 2021-08-05 | End: 2022-01-31

## 2021-08-05 RX ORDER — TOPIRAMATE 100 MG/1
100 TABLET, FILM COATED ORAL 2 TIMES DAILY
Qty: 180 TABLET | Refills: 1 | Status: SHIPPED | OUTPATIENT
Start: 2021-08-05 | End: 2022-01-31

## 2021-08-05 RX ORDER — TOPIRAMATE 50 MG/1
50 TABLET, FILM COATED ORAL 2 TIMES DAILY
Qty: 180 TABLET | Refills: 1 | Status: SHIPPED | OUTPATIENT
Start: 2021-08-05 | End: 2022-01-31

## 2021-08-05 RX ORDER — LEVOTHYROXINE SODIUM 200 MCG
200 TABLET ORAL DAILY
Qty: 90 TABLET | Refills: 1 | Status: SHIPPED | OUTPATIENT
Start: 2021-08-05 | End: 2021-10-05 | Stop reason: SDUPTHER

## 2021-08-05 ASSESSMENT — ENCOUNTER SYMPTOMS
ABDOMINAL PAIN: 0
CONSTIPATION: 0
EYE PAIN: 0
SORE THROAT: 0
EYE REDNESS: 0
DIARRHEA: 0
VOICE CHANGE: 0
COUGH: 0
BLOOD IN STOOL: 0
TROUBLE SWALLOWING: 0
NAUSEA: 0
VOMITING: 0
WHEEZING: 0
SINUS PRESSURE: 0
CHEST TIGHTNESS: 0
COLOR CHANGE: 0

## 2021-08-05 NOTE — PROGRESS NOTES
Chief Complaint:   Ana Banda is a 55 y.o. female who presents forcomplete physical exam.    History of Present Illness:      Ana Banda is a 55 y.o. female who presents todayfor wellness visit AND follow up on her chronic medical conditions as noted below. Patient Active Problem List    Diagnosis Date Noted    Generalized anxiety disorder 2020    Postsurgical hypothyroidism 2020       Past Medical History:   Diagnosis Date    Anxiety     H/O partial thyroidectomy     Nithin Sandoval    Hypothyroidism        Past Surgical History:   Procedure Laterality Date     SECTION      FINGER SURGERY      Thumb surgery  (Broke playing softball)    KNEE SURGERY      multiple scopes left knee    THYROIDECTOMY, PARTIAL      Nithin Sandoval benign mass       Current Outpatient Medications   Medication Sig Dispense Refill    venlafaxine (EFFEXOR XR) 150 MG extended release capsule Take 1 capsule by mouth daily 90 capsule 1    topiramate (TOPAMAX) 50 MG tablet Take 1 tablet by mouth 2 times daily 180 tablet 1    topiramate (TOPAMAX) 100 MG tablet Take 1 tablet by mouth 2 times daily 180 tablet 1    SYNTHROID 25 MCG tablet Take 1.5 tablets by mouth Daily 135 tablet 1    SYNTHROID 200 MCG tablet Take 1 tablet by mouth Daily 90 tablet 1     No current facility-administered medications for this visit.      Allergies   Allergen Reactions    Sulfa Antibiotics Rash       Social History     Socioeconomic History    Marital status:      Spouse name: Victorina Woods Number of children: 1    Years of education: None    Highest education level: None   Occupational History    Occupation: XRAY Tech     Employer: [employer]   Tobacco Use    Smoking status: Never Smoker    Smokeless tobacco: Never Used   Substance and Sexual Activity    Alcohol use: Not Currently    Drug use: Never    Sexual activity: Yes   Other Topics Concern    None   Social History Narrative    None     Social Determinants of Health     Financial Resource Strain: Low Risk     Difficulty of Paying Living Expenses: Not hard at all   Food Insecurity: No Food Insecurity    Worried About Running Out of Food in the Last Year: Never true    Eugenia of Food in the Last Year: Never true   Transportation Needs: No Transportation Needs    Lack of Transportation (Medical): No    Lack of Transportation (Non-Medical):  No   Physical Activity:     Days of Exercise per Week:     Minutes of Exercise per Session:    Stress:     Feeling of Stress :    Social Connections:     Frequency of Communication with Friends and Family:     Frequency of Social Gatherings with Friends and Family:     Attends Restorationist Services:     Active Member of Clubs or Organizations:     Attends Club or Organization Meetings:     Marital Status:    Intimate Partner Violence:     Fear of Current or Ex-Partner:     Emotionally Abused:     Physically Abused:     Sexually Abused:      Family History   Problem Relation Age of Onset    Atrial Fibrillation Mother     Stroke Mother     Diabetes Father     Prostate Cancer Father           Past Surgical History:   Procedure Laterality Date     SECTION  2008    FINGER SURGERY      Thumb surgery  (Broke playing softball)    KNEE SURGERY      multiple scopes left knee    THYROIDECTOMY, PARTIAL      Nithin Sandoval benign mass         Lab Review   Orders Only on 2021   Component Date Value    Hep C Ab Interp 2021 Non-Reactive     WBC 2021 6.5     RBC 2021 5.11     Hemoglobin 2021 12.9     Hematocrit 2021 41.5     MCV 2021 81.2     MCH 2021 25.2*    MCHC 2021 31.1*    RDW 2021 16.2*    Platelets  225     MPV 2021 10.4     Neutrophils % 2021 54.9     Lymphocytes % 2021 36.4     Monocytes % 2021 8.2     Eosinophils % 2021 0.0     Basophils % 2021 0.0     Neutrophils Absolute 08/02/2021 3.6     Immature Granulocytes # 08/02/2021 0.0     Lymphocytes Absolute 08/02/2021 2.4     Monocytes Absolute 08/02/2021 0.50     Eosinophils Absolute 08/02/2021 0.00     Basophils Absolute 08/02/2021 0.00     Sodium 08/02/2021 139     Potassium 08/02/2021 3.9     Chloride 08/02/2021 105     CO2 08/02/2021 24     Anion Gap 08/02/2021 10     Glucose 08/02/2021 107     BUN 08/02/2021 13     CREATININE 08/02/2021 0.7     GFR Non- 08/02/2021 >60     GFR  08/02/2021 >59     Calcium 08/02/2021 9.2     Total Protein 08/02/2021 7.0     Albumin 08/02/2021 4.2     Total Bilirubin 08/02/2021 0.3     Alkaline Phosphatase 08/02/2021 104     ALT 08/02/2021 9     AST 08/02/2021 14     Hemoglobin A1C 08/02/2021 5.5     Cholesterol, Total 08/02/2021 161     Triglycerides 08/02/2021 95     HDL 08/02/2021 52*    LDL Calculated 08/02/2021 90     Color, UA 08/02/2021 YELLOW     Clarity, UA 08/02/2021 Clear     Glucose, Ur 08/02/2021 Negative     Bilirubin Urine 08/02/2021 Negative     Ketones, Urine 08/02/2021 TRACE*    Specific Zebulon, UA 08/02/2021 1.024     Blood, Urine 08/02/2021 Negative     pH, UA 08/02/2021 5.5     Protein, UA 08/02/2021 Negative     Urobilinogen, Urine 08/02/2021 1.0     Nitrite, Urine 08/02/2021 Negative     Leukocyte Esterase, Urine 08/02/2021 Negative     TSH 08/02/2021 0.007*    Vit D, 25-Hydroxy 08/02/2021 43.5     T4 Free 08/02/2021 1.69          Review of Systems   Constitutional: Positive for fatigue. Negative for chills and fever. HENT: Negative for congestion, ear pain, postnasal drip, sinus pressure, sore throat, trouble swallowing and voice change. Eyes: Negative for pain, redness and visual disturbance. Respiratory: Negative for cough, chest tightness and wheezing. Cardiovascular: Negative for chest pain, palpitations and leg swelling.    Gastrointestinal: Negative for abdominal pain, blood in stool, constipation, diarrhea, nausea and vomiting. Endocrine: Negative for polydipsia and polyuria. Genitourinary: Negative for dysuria, enuresis, flank pain, frequency and urgency. Musculoskeletal: Negative for arthralgias, gait problem and joint swelling. Skin: Negative for color change and rash. Neurological: Negative for dizziness, tremors, syncope, facial asymmetry, speech difficulty, weakness, numbness and headaches. Psychiatric/Behavioral: Negative for agitation, behavioral problems, confusion, sleep disturbance and suicidal ideas. The patient is nervous/anxious. Vitals:    08/05/21 0801   BP: 110/80   Site: Left Upper Arm   Position: Sitting   Cuff Size: Large Adult   Pulse: 74   Resp: 18   SpO2: 98%   Weight: 192 lb (87.1 kg)   Height: 5' 6\" (1.676 m)      Wt Readings from Last 3 Encounters:   08/05/21 192 lb (87.1 kg)   01/25/21 193 lb (87.5 kg)   09/21/20 195 lb (88.5 kg)   Body mass index is 30.99 kg/m². BP Readings from Last 3 Encounters:   08/05/21 110/80   01/25/21 100/70   09/21/20 100/78       Physical Exam  Constitutional:       General: She is not in acute distress. Appearance: She is well-developed. She is not diaphoretic. HENT:      Head: Normocephalic and atraumatic. Nose: Nose normal.   Eyes:      General: No scleral icterus. Right eye: No discharge. Left eye: No discharge. Conjunctiva/sclera: Conjunctivae normal.      Pupils: Pupils are equal, round, and reactive to light. Neck:      Thyroid: No thyromegaly. Vascular: No JVD. Cardiovascular:      Rate and Rhythm: Normal rate and regular rhythm. Heart sounds: No murmur heard. Pulmonary:      Breath sounds: Normal breath sounds. No wheezing or rales. Chest:      Chest wall: No tenderness. Abdominal:      General: Bowel sounds are normal. There is no distension. Tenderness: There is no guarding. Musculoskeletal:      Cervical back: Normal range of motion. simple carbohydrates and animal-based products that high in saturated fats     Elevated fasting blood sugar over 100 on fasting blood work  Hemoglobin A1c 5.5 in 7/2021 (5.6 in 1/2021 ( 5.6)  Healthy, mostly fiber rich nonstarchy plant-based diet recommended  Recommend to decrease intake of processed foods, simple carbohydrates and animal-based products that high in saturated fats       Vitamin D level is 43 in 8/2021 ( 81 in January 2021)  Suggest continue multivitamin and take vitamin D 1000 - 2000 IU daily     Migraine syndrome  Takes rx topamax   maxalt prn    Orders Placed This Encounter   Procedures    Cologuard (For External Results Only)    LUDY DIGITAL SCREEN W OR WO CAD BILATERAL    TSH without Reflex    T4, Free     New Prescriptions    No medications on file      There are no Patient Instructions on file for this visit. Return in about 6 months (around 2/5/2022) for Medication check. EMR Dragon/transcription disclaimer:Significant part of this  encounter note is electronic transcription/translation of spoken language to printed text. The electronic translation of spoken language may beerroneous, or at times, nonsensical words or phrases may be inadvertently transcribed.  Although I have reviewed the note for such errors, some may still exist.

## 2021-10-05 ENCOUNTER — PATIENT MESSAGE (OUTPATIENT)
Dept: INTERNAL MEDICINE | Age: 46
End: 2021-10-05

## 2021-10-05 RX ORDER — LEVOTHYROXINE SODIUM 200 MCG
200 TABLET ORAL DAILY
Qty: 90 TABLET | Refills: 1 | Status: SHIPPED | OUTPATIENT
Start: 2021-10-05 | End: 2022-04-19 | Stop reason: SDUPTHER

## 2021-10-05 NOTE — TELEPHONE ENCOUNTER
Last Appointment Date: 8/5/2021  Next Appointment Date: 2/3/2022    Allergies   Allergen Reactions    Sulfa Antibiotics Rash

## 2021-10-05 NOTE — TELEPHONE ENCOUNTER
From: Aleks Goel  To: Miriam Cisse MD  Sent: 10/5/2021 9:11 AM CDT  Subject: Prescription Question    I am needing a refill on my Synthroid 200 mcg please. The last prescription was for a 90 day supply. I would greatly appreciate if I could have that again.  Thank you

## 2022-01-31 RX ORDER — TOPIRAMATE 100 MG/1
TABLET, FILM COATED ORAL
Qty: 180 TABLET | Refills: 1 | Status: SHIPPED | OUTPATIENT
Start: 2022-01-31 | End: 2022-08-22

## 2022-01-31 RX ORDER — TOPIRAMATE 50 MG/1
TABLET, FILM COATED ORAL
Qty: 180 TABLET | Refills: 1 | Status: SHIPPED | OUTPATIENT
Start: 2022-01-31 | End: 2022-08-24 | Stop reason: SDUPTHER

## 2022-01-31 RX ORDER — VENLAFAXINE HYDROCHLORIDE 150 MG/1
CAPSULE, EXTENDED RELEASE ORAL
Qty: 90 CAPSULE | Refills: 1 | Status: SHIPPED | OUTPATIENT
Start: 2022-01-31 | End: 2022-08-22

## 2022-01-31 NOTE — TELEPHONE ENCOUNTER
Jordy Quintanilla called requesting a refill of the below medication which has been pended for you:     Requested Prescriptions     Pending Prescriptions Disp Refills    topiramate (TOPAMAX) 50 MG tablet [Pharmacy Med Name: TOPIRAMATE 50 MG TABLET] 180 tablet 1     Sig: TAKE 1 TABLET BY MOUTH TWICE A DAY    topiramate (TOPAMAX) 100 MG tablet [Pharmacy Med Name: TOPIRAMATE 100 MG TABLET] 180 tablet 1     Sig: TAKE 1 TABLET BY MOUTH TWICE A DAY    venlafaxine (EFFEXOR XR) 150 MG extended release capsule [Pharmacy Med Name: VENLAFAXINE HCL  MG CAP] 90 capsule 1     Sig: TAKE 1 CAPSULE BY MOUTH EVERY DAY       Last Appointment Date: 8/5/2021  Next Appointment Date: 2/3/2022    Allergies   Allergen Reactions    Sulfa Antibiotics Rash

## 2022-02-01 DIAGNOSIS — E89.0 POSTSURGICAL HYPOTHYROIDISM: ICD-10-CM

## 2022-02-01 LAB
T4 FREE: 1.61 NG/DL (ref 0.93–1.7)
TSH SERPL DL<=0.05 MIU/L-ACNC: 0.04 UIU/ML (ref 0.27–4.2)

## 2022-02-08 ENCOUNTER — OFFICE VISIT (OUTPATIENT)
Dept: INTERNAL MEDICINE | Age: 47
End: 2022-02-08
Payer: COMMERCIAL

## 2022-02-08 VITALS
BODY MASS INDEX: 31.18 KG/M2 | OXYGEN SATURATION: 98 % | HEART RATE: 80 BPM | WEIGHT: 194 LBS | DIASTOLIC BLOOD PRESSURE: 80 MMHG | RESPIRATION RATE: 18 BRPM | HEIGHT: 66 IN | SYSTOLIC BLOOD PRESSURE: 128 MMHG

## 2022-02-08 DIAGNOSIS — G44.89 HEADACHE SYNDROME: ICD-10-CM

## 2022-02-08 DIAGNOSIS — G43.909 MIGRAINE SYNDROME: ICD-10-CM

## 2022-02-08 DIAGNOSIS — E89.0 POSTSURGICAL HYPOTHYROIDISM: Primary | ICD-10-CM

## 2022-02-08 DIAGNOSIS — E66.09 EXOGENOUS OBESITY: ICD-10-CM

## 2022-02-08 DIAGNOSIS — E55.9 VITAMIN D DEFICIENCY: ICD-10-CM

## 2022-02-08 DIAGNOSIS — F41.1 GENERALIZED ANXIETY DISORDER: ICD-10-CM

## 2022-02-08 PROCEDURE — 99214 OFFICE O/P EST MOD 30 MIN: CPT | Performed by: INTERNAL MEDICINE

## 2022-02-08 RX ORDER — PHENTERMINE HYDROCHLORIDE 37.5 MG/1
37.5 TABLET ORAL
Qty: 30 TABLET | Refills: 1 | Status: SHIPPED | OUTPATIENT
Start: 2022-02-08 | End: 2022-03-10

## 2022-02-08 SDOH — ECONOMIC STABILITY: FOOD INSECURITY: WITHIN THE PAST 12 MONTHS, THE FOOD YOU BOUGHT JUST DIDN'T LAST AND YOU DIDN'T HAVE MONEY TO GET MORE.: NEVER TRUE

## 2022-02-08 SDOH — ECONOMIC STABILITY: FOOD INSECURITY: WITHIN THE PAST 12 MONTHS, YOU WORRIED THAT YOUR FOOD WOULD RUN OUT BEFORE YOU GOT MONEY TO BUY MORE.: NEVER TRUE

## 2022-02-08 ASSESSMENT — ENCOUNTER SYMPTOMS
CHEST TIGHTNESS: 0
SORE THROAT: 0
CONSTIPATION: 0
WHEEZING: 0
COUGH: 0
ABDOMINAL PAIN: 0

## 2022-02-08 ASSESSMENT — PATIENT HEALTH QUESTIONNAIRE - PHQ9
SUM OF ALL RESPONSES TO PHQ QUESTIONS 1-9: 0
1. LITTLE INTEREST OR PLEASURE IN DOING THINGS: 0
SUM OF ALL RESPONSES TO PHQ9 QUESTIONS 1 & 2: 0
SUM OF ALL RESPONSES TO PHQ QUESTIONS 1-9: 0
2. FEELING DOWN, DEPRESSED OR HOPELESS: 0

## 2022-02-08 ASSESSMENT — SOCIAL DETERMINANTS OF HEALTH (SDOH): HOW HARD IS IT FOR YOU TO PAY FOR THE VERY BASICS LIKE FOOD, HOUSING, MEDICAL CARE, AND HEATING?: NOT HARD AT ALL

## 2022-02-08 NOTE — PROGRESS NOTES
Chief Complaint   Patient presents with    6 Month Follow-Up     History of presenting illness:  Lynn Caceres is a53 y.o. female who presents today for follow up on her chronic medical conditions as noted below. Patient Active Problem List    Diagnosis Date Noted    Generalized anxiety disorder 2020    Postsurgical hypothyroidism 2020     Past Medical History:   Diagnosis Date    Anxiety     H/O partial thyroidectomy     Nithin Sandoval    Hypothyroidism       Past Surgical History:   Procedure Laterality Date     SECTION      FINGER SURGERY      Thumb surgery  (Broke playing softball)    KNEE SURGERY      multiple scopes left knee    THYROIDECTOMY, PARTIAL      Nithin Sandoval benign mass     Current Outpatient Medications   Medication Sig Dispense Refill    phentermine (ADIPEX-P) 37.5 MG tablet Take 1 tablet by mouth every morning (before breakfast) for 30 days. 30 tablet 1    topiramate (TOPAMAX) 50 MG tablet TAKE 1 TABLET BY MOUTH TWICE A  tablet 1    topiramate (TOPAMAX) 100 MG tablet TAKE 1 TABLET BY MOUTH TWICE A  tablet 1    venlafaxine (EFFEXOR XR) 150 MG extended release capsule TAKE 1 CAPSULE BY MOUTH EVERY DAY 90 capsule 1    SYNTHROID 200 MCG tablet Take 1 tablet by mouth Daily 90 tablet 1    SYNTHROID 25 MCG tablet Take 1.5 tablets by mouth Daily 135 tablet 1     No current facility-administered medications for this visit. Allergies   Allergen Reactions    E.E.S. [Erythromycin]      Was told this as a Kid    Sulfa Antibiotics Rash     Social History     Tobacco Use    Smoking status: Never Smoker    Smokeless tobacco: Never Used   Substance Use Topics    Alcohol use: Not Currently      Family History   Problem Relation Age of Onset    Atrial Fibrillation Mother     Stroke Mother     Diabetes Father     Prostate Cancer Father        Review of Systems   Constitutional: Positive for fatigue. Negative for chills and fever. HENT: Negative for congestion, ear pain, nosebleeds, postnasal drip and sore throat. Respiratory: Negative for cough, chest tightness and wheezing. Cardiovascular: Negative for chest pain, palpitations and leg swelling. Gastrointestinal: Negative for abdominal pain and constipation. Genitourinary: Negative for dysuria and urgency. Musculoskeletal: Negative. Negative for arthralgias. Skin: Negative for rash. Neurological: Positive for headaches. Negative for dizziness. Psychiatric/Behavioral: Negative. Vitals:    02/08/22 0750   BP: 128/80   Site: Left Upper Arm   Position: Sitting   Cuff Size: Large Adult   Pulse: 80   Resp: 18   SpO2: 98%   Weight: 194 lb (88 kg)   Height: 5' 6\" (1.676 m)     Body mass index is 31.31 kg/m². Physical Exam  Constitutional:       Appearance: She is well-developed. HENT:      Right Ear: External ear normal.      Left Ear: External ear normal.      Mouth/Throat:      Pharynx: No oropharyngeal exudate. Eyes:      Conjunctiva/sclera: Conjunctivae normal.      Pupils: Pupils are equal, round, and reactive to light. Neck:      Thyroid: No thyromegaly. Vascular: No JVD. Cardiovascular:      Rate and Rhythm: Normal rate. Heart sounds: Normal heart sounds. No murmur heard. Pulmonary:      Effort: No respiratory distress. Breath sounds: Normal breath sounds. No wheezing or rales. Chest:      Chest wall: No tenderness. Abdominal:      General: Bowel sounds are normal.      Palpations: Abdomen is soft. Musculoskeletal:         General: Normal range of motion. Cervical back: Neck supple. Lymphadenopathy:      Cervical: No cervical adenopathy. Skin:     General: Skin is warm. Findings: No rash. Neurological:      Mental Status: She is oriented to person, place, and time.          Lab Review   Orders Only on 02/01/2022   Component Date Value    TSH 02/01/2022 0.041*    T4 Free 02/01/2022 1.61 area  Recommend  Topamax change  Take Topamax 50 mg in a.m. Increase evening dose Topamax 150 mg  maxalt prn            Orders Placed This Encounter   Procedures    Comprehensive Metabolic Panel    CBC Auto Differential    Hemoglobin A1C    Lipid Panel    Urinalysis    TSH without Reflex    Vitamin D 25 Hydroxy    T4, Free     New Prescriptions    PHENTERMINE (ADIPEX-P) 37.5 MG TABLET    Take 1 tablet by mouth every morning (before breakfast) for 30 days. Return in about 6 months (around 8/1/2022) for Annual Physical.   There are no Patient Instructions on file for this visit. EMR Dragon/transcription disclaimer:Significant part of this  encounter note is electronic transcription/translationof spoken language to printed text. The electronic translation of spoken language may be erroneous, or at times, nonsensical words or phrases may be inadvertently transcribed.  Although I have reviewed the note for sucherrors, some may still exist.

## 2022-04-19 ENCOUNTER — PATIENT MESSAGE (OUTPATIENT)
Dept: INTERNAL MEDICINE | Age: 47
End: 2022-04-19

## 2022-04-19 RX ORDER — LEVOTHYROXINE SODIUM 25 MCG
37.5 TABLET ORAL DAILY
Qty: 135 TABLET | Refills: 1 | Status: SHIPPED | OUTPATIENT
Start: 2022-04-19 | End: 2022-10-12 | Stop reason: SDUPTHER

## 2022-04-19 RX ORDER — LEVOTHYROXINE SODIUM 200 MCG
200 TABLET ORAL DAILY
Qty: 90 TABLET | Refills: 1 | Status: SHIPPED | OUTPATIENT
Start: 2022-04-19

## 2022-04-19 NOTE — TELEPHONE ENCOUNTER
Deann Sewell called requesting a refill of the below medication which has been pended for you:     Requested Prescriptions     Pending Prescriptions Disp Refills    SYNTHROID 200 MCG tablet 90 tablet 1     Sig: Take 1 tablet by mouth Daily    SYNTHROID 25 MCG tablet 135 tablet 1     Sig: Take 1.5 tablets by mouth Daily       Last Appointment Date: 2/8/2022  Next Appointment Date: 8/1/2022    Allergies   Allergen Reactions    E.E.S.  [Erythromycin]      Was told this as a Kid    Sulfa Antibiotics Rash

## 2022-04-19 NOTE — TELEPHONE ENCOUNTER
From: Booker Minium  To: Dr. Kevin Less: 4/19/2022 8:19 AM CDT  Subject: Prescription Refill    I am needing a refill on my Synthroid please, both the 200 MCG & 25 MCG. This was for a 90 day supply at [facility] in [facility].    Thank you and have a great day

## 2022-08-22 RX ORDER — VENLAFAXINE HYDROCHLORIDE 150 MG/1
CAPSULE, EXTENDED RELEASE ORAL
Qty: 90 CAPSULE | Refills: 1 | Status: SHIPPED | OUTPATIENT
Start: 2022-08-22 | End: 2022-08-24 | Stop reason: SDUPTHER

## 2022-08-22 RX ORDER — TOPIRAMATE 50 MG/1
TABLET, FILM COATED ORAL
Qty: 180 TABLET | Refills: 1 | OUTPATIENT
Start: 2022-08-22

## 2022-08-22 RX ORDER — TOPIRAMATE 100 MG/1
TABLET, FILM COATED ORAL
Qty: 180 TABLET | Refills: 1 | Status: SHIPPED | OUTPATIENT
Start: 2022-08-22 | End: 2022-08-24 | Stop reason: SDUPTHER

## 2022-08-22 NOTE — TELEPHONE ENCOUNTER
Jim Mayfield called requesting a refill of the below medication which has been pended for you:     Requested Prescriptions     Pending Prescriptions Disp Refills    topiramate (TOPAMAX) 100 MG tablet [Pharmacy Med Name: TOPIRAMATE 100 MG TABLET] 180 tablet 1     Sig: TAKE 1 TABLET BY MOUTH TWICE A DAY    venlafaxine (EFFEXOR XR) 150 MG extended release capsule [Pharmacy Med Name: VENLAFAXINE HCL  MG CAP] 90 capsule 1     Sig: TAKE 1 CAPSULE BY MOUTH EVERY DAY       Last Appointment Date: 2/8/2022  Next Appointment Date: 8/21/2022    Allergies   Allergen Reactions    E.E.S.  [Erythromycin]      Was told this as a Kid    Sulfa Antibiotics Rash

## 2022-08-24 RX ORDER — TOPIRAMATE 50 MG/1
50 TABLET, FILM COATED ORAL 2 TIMES DAILY
Qty: 180 TABLET | Refills: 0 | Status: SHIPPED | OUTPATIENT
Start: 2022-08-24

## 2022-08-24 RX ORDER — VENLAFAXINE HYDROCHLORIDE 150 MG/1
150 CAPSULE, EXTENDED RELEASE ORAL DAILY
Qty: 90 CAPSULE | Refills: 0 | Status: SHIPPED | OUTPATIENT
Start: 2022-08-24

## 2022-08-24 RX ORDER — TOPIRAMATE 100 MG/1
100 TABLET, FILM COATED ORAL 2 TIMES DAILY
Qty: 180 TABLET | Refills: 0 | Status: SHIPPED | OUTPATIENT
Start: 2022-08-24

## 2022-08-24 NOTE — TELEPHONE ENCOUNTER
Jaleesa Tipton called requesting a refill of the below medication which has been pended for you:     Requested Prescriptions     Pending Prescriptions Disp Refills    topiramate (TOPAMAX) 50 MG tablet 180 tablet 0     Sig: Take 1 tablet by mouth 2 times daily    topiramate (TOPAMAX) 100 MG tablet 180 tablet 0     Sig: Take 1 tablet by mouth 2 times daily    venlafaxine (EFFEXOR XR) 150 MG extended release capsule 90 capsule 0     Sig: Take 1 capsule by mouth daily       Last Appointment Date: 2/8/2022  Next Appointment Date: 9/20/2022    Allergies   Allergen Reactions    E.E.S.  [Erythromycin]      Was told this as a Kid    Sulfa Antibiotics Rash

## 2022-10-10 DIAGNOSIS — G43.909 MIGRAINE SYNDROME: ICD-10-CM

## 2022-10-10 DIAGNOSIS — E66.09 EXOGENOUS OBESITY: ICD-10-CM

## 2022-10-10 DIAGNOSIS — E55.9 VITAMIN D DEFICIENCY: ICD-10-CM

## 2022-10-10 DIAGNOSIS — G44.89 HEADACHE SYNDROME: ICD-10-CM

## 2022-10-10 DIAGNOSIS — E89.0 POSTSURGICAL HYPOTHYROIDISM: ICD-10-CM

## 2022-10-10 DIAGNOSIS — F41.1 GENERALIZED ANXIETY DISORDER: ICD-10-CM

## 2022-10-10 LAB
ALBUMIN SERPL-MCNC: 4.8 G/DL (ref 3.5–5.2)
ALP BLD-CCNC: 101 U/L (ref 35–104)
ALT SERPL-CCNC: 13 U/L (ref 5–33)
ANION GAP SERPL CALCULATED.3IONS-SCNC: 13 MMOL/L (ref 7–19)
AST SERPL-CCNC: 17 U/L (ref 5–32)
BASOPHILS ABSOLUTE: 0 K/UL (ref 0–0.2)
BASOPHILS RELATIVE PERCENT: 0 % (ref 0–1)
BILIRUB SERPL-MCNC: 0.4 MG/DL (ref 0.2–1.2)
BILIRUBIN URINE: NEGATIVE
BLOOD, URINE: NEGATIVE
BUN BLDV-MCNC: 13 MG/DL (ref 6–20)
CALCIUM SERPL-MCNC: 9.1 MG/DL (ref 8.6–10)
CHLORIDE BLD-SCNC: 111 MMOL/L (ref 98–111)
CHOLESTEROL, TOTAL: 186 MG/DL (ref 160–199)
CLARITY: CLEAR
CO2: 21 MMOL/L (ref 22–29)
COLOR: ABNORMAL
CREAT SERPL-MCNC: 0.7 MG/DL (ref 0.5–0.9)
EOSINOPHILS ABSOLUTE: 0 K/UL (ref 0–0.6)
EOSINOPHILS RELATIVE PERCENT: 0 % (ref 0–5)
GFR AFRICAN AMERICAN: >59
GFR NON-AFRICAN AMERICAN: >60
GLUCOSE BLD-MCNC: 112 MG/DL (ref 74–109)
GLUCOSE URINE: NEGATIVE MG/DL
HBA1C MFR BLD: 5.6 % (ref 4–6)
HCT VFR BLD CALC: 42.4 % (ref 37–47)
HDLC SERPL-MCNC: 56 MG/DL (ref 65–121)
HEMOGLOBIN: 12.7 G/DL (ref 12–16)
IMMATURE GRANULOCYTES #: 0 K/UL
KETONES, URINE: ABNORMAL MG/DL
LDL CHOLESTEROL CALCULATED: 104 MG/DL
LEUKOCYTE ESTERASE, URINE: NEGATIVE
LYMPHOCYTES ABSOLUTE: 2.4 K/UL (ref 1.1–4.5)
LYMPHOCYTES RELATIVE PERCENT: 41.6 % (ref 20–40)
MCH RBC QN AUTO: 23.9 PG (ref 27–31)
MCHC RBC AUTO-ENTMCNC: 30 G/DL (ref 33–37)
MCV RBC AUTO: 79.8 FL (ref 81–99)
MONOCYTES ABSOLUTE: 0.5 K/UL (ref 0–0.9)
MONOCYTES RELATIVE PERCENT: 8.3 % (ref 0–10)
NEUTROPHILS ABSOLUTE: 2.8 K/UL (ref 1.5–7.5)
NEUTROPHILS RELATIVE PERCENT: 49.7 % (ref 50–65)
NITRITE, URINE: NEGATIVE
PDW BLD-RTO: 17 % (ref 11.5–14.5)
PH UA: 6.5 (ref 5–8)
PLATELET # BLD: 249 K/UL (ref 130–400)
PMV BLD AUTO: 9.4 FL (ref 9.4–12.3)
POTASSIUM SERPL-SCNC: 4.1 MMOL/L (ref 3.5–5)
PROTEIN UA: NEGATIVE MG/DL
RBC # BLD: 5.31 M/UL (ref 4.2–5.4)
SODIUM BLD-SCNC: 145 MMOL/L (ref 136–145)
SPECIFIC GRAVITY UA: 1.02 (ref 1–1.03)
T4 FREE: 1.83 NG/DL (ref 0.93–1.7)
TOTAL PROTEIN: 6.8 G/DL (ref 6.6–8.7)
TRIGL SERPL-MCNC: 128 MG/DL (ref 0–149)
TSH SERPL DL<=0.05 MIU/L-ACNC: 0.01 UIU/ML (ref 0.27–4.2)
UROBILINOGEN, URINE: 0.2 E.U./DL
VITAMIN D 25-HYDROXY: 67 NG/ML
WBC # BLD: 5.7 K/UL (ref 4.8–10.8)

## 2022-10-12 ENCOUNTER — OFFICE VISIT (OUTPATIENT)
Dept: INTERNAL MEDICINE | Age: 47
End: 2022-10-12
Payer: COMMERCIAL

## 2022-10-12 VITALS
OXYGEN SATURATION: 98 % | SYSTOLIC BLOOD PRESSURE: 120 MMHG | RESPIRATION RATE: 18 BRPM | HEIGHT: 66 IN | HEART RATE: 91 BPM | DIASTOLIC BLOOD PRESSURE: 80 MMHG | WEIGHT: 203 LBS | BODY MASS INDEX: 32.62 KG/M2

## 2022-10-12 DIAGNOSIS — E55.9 VITAMIN D DEFICIENCY: ICD-10-CM

## 2022-10-12 DIAGNOSIS — Z12.31 ENCOUNTER FOR SCREENING MAMMOGRAM FOR BREAST CANCER: Primary | ICD-10-CM

## 2022-10-12 DIAGNOSIS — F41.1 GENERALIZED ANXIETY DISORDER: ICD-10-CM

## 2022-10-12 DIAGNOSIS — R73.01 IFG (IMPAIRED FASTING GLUCOSE): ICD-10-CM

## 2022-10-12 DIAGNOSIS — Z00.00 ANNUAL PHYSICAL EXAM: ICD-10-CM

## 2022-10-12 DIAGNOSIS — Z12.11 SCREEN FOR COLON CANCER: ICD-10-CM

## 2022-10-12 DIAGNOSIS — E66.09 EXOGENOUS OBESITY: ICD-10-CM

## 2022-10-12 DIAGNOSIS — E89.0 POSTSURGICAL HYPOTHYROIDISM: ICD-10-CM

## 2022-10-12 PROCEDURE — 99396 PREV VISIT EST AGE 40-64: CPT | Performed by: INTERNAL MEDICINE

## 2022-10-12 RX ORDER — PHENTERMINE HYDROCHLORIDE 37.5 MG/1
37.5 TABLET ORAL
Qty: 30 TABLET | Refills: 1 | Status: CANCELLED | OUTPATIENT
Start: 2022-10-12 | End: 2022-11-11

## 2022-10-12 RX ORDER — TRAZODONE HYDROCHLORIDE 50 MG/1
50 TABLET ORAL NIGHTLY PRN
Qty: 30 TABLET | Refills: 5 | Status: SHIPPED | OUTPATIENT
Start: 2022-10-12

## 2022-10-12 RX ORDER — MULTIVIT-MIN/IRON/FOLIC ACID/K 18-600-40
CAPSULE ORAL
COMMUNITY

## 2022-10-12 RX ORDER — LEVOTHYROXINE SODIUM 25 MCG
25 TABLET ORAL DAILY
Qty: 90 TABLET | Refills: 1
Start: 2022-10-12

## 2022-10-12 ASSESSMENT — PATIENT HEALTH QUESTIONNAIRE - PHQ9
SUM OF ALL RESPONSES TO PHQ QUESTIONS 1-9: 0
2. FEELING DOWN, DEPRESSED OR HOPELESS: 0
SUM OF ALL RESPONSES TO PHQ9 QUESTIONS 1 & 2: 0
SUM OF ALL RESPONSES TO PHQ QUESTIONS 1-9: 0
1. LITTLE INTEREST OR PLEASURE IN DOING THINGS: 0

## 2022-10-12 ASSESSMENT — ENCOUNTER SYMPTOMS
WHEEZING: 0
COUGH: 0
CHEST TIGHTNESS: 0
SORE THROAT: 0
CONSTIPATION: 0
ABDOMINAL PAIN: 0

## 2022-10-12 NOTE — PROGRESS NOTES
Chief Complaint:   Teri Nation is a 52 y.o. female who presents forcomplete physical exam.    History of Present Illness:      Teri Nation is a 52 y.o. female who presents todayfor wellness visit AND follow up on her chronic medical conditions as noted below. Patient Active Problem List    Diagnosis Date Noted    Generalized anxiety disorder 2020    Postsurgical hypothyroidism 2020       Past Medical History:   Diagnosis Date    Anxiety     H/O partial thyroidectomy     Nithinharper Sandoval    Hypothyroidism        Past Surgical History:   Procedure Laterality Date     SECTION      FINGER SURGERY      Thumb surgery  (Broke playing softball)    KNEE SURGERY      multiple scopes left knee    THYROIDECTOMY, PARTIAL      Nithin Sandoval benign mass       Current Outpatient Medications   Medication Sig Dispense Refill    Cholecalciferol (VITAMIN D) 50 MCG ( UT) CAPS capsule Take by mouth      traZODone (DESYREL) 50 MG tablet Take 1 tablet by mouth nightly as needed for Sleep 30 tablet 5    SYNTHROID 25 MCG tablet Take 1 tablet by mouth Daily 90 tablet 1    Semaglutide, 1 MG/DOSE, 4 MG/3ML SOPN Inject 1 mg into the skin once a week 3 mL 2    topiramate (TOPAMAX) 50 MG tablet Take 1 tablet by mouth 2 times daily 180 tablet 0    topiramate (TOPAMAX) 100 MG tablet Take 1 tablet by mouth 2 times daily 180 tablet 0    venlafaxine (EFFEXOR XR) 150 MG extended release capsule Take 1 capsule by mouth daily 90 capsule 0    SYNTHROID 200 MCG tablet Take 1 tablet by mouth Daily 90 tablet 1     No current facility-administered medications for this visit. Allergies   Allergen Reactions    E.E.S.  [Erythromycin]      Was told this as a Kid    Sulfa Antibiotics Rash       Social History     Socioeconomic History    Marital status:      Spouse name: Zuri Lux    Number of children: 1    Years of education: None    Highest education level: None   Occupational History    Occupation: XRAY Tech Employer: [employer]   Tobacco Use    Smoking status: Never    Smokeless tobacco: Never   Substance and Sexual Activity    Alcohol use: Not Currently    Drug use: Never    Sexual activity: Yes     Social Determinants of Health     Financial Resource Strain: Low Risk     Difficulty of Paying Living Expenses: Not hard at all   Food Insecurity: No Food Insecurity    Worried About Running Out of Food in the Last Year: Never true    Ran Out of Food in the Last Year: Never true     Family History   Problem Relation Age of Onset    Atrial Fibrillation Mother     Stroke Mother     Diabetes Father     Prostate Cancer Father           Past Surgical History:   Procedure Laterality Date     SECTION  2008    FINGER SURGERY      Thumb surgery  (Broke playing softball)    KNEE SURGERY      multiple scopes left knee    THYROIDECTOMY, PARTIAL      Nithin Sandoval benign mass         Lab Review   Orders Only on 10/10/2022   Component Date Value    T4 Free 10/10/2022 1.83 (A)     Vit D, 25-Hydroxy 10/10/2022 67.0     TSH 10/10/2022 0.006 (A)     Color, UA 10/10/2022 DARK YELLOW (A)     Clarity, UA 10/10/2022 Clear     Glucose, Ur 10/10/2022 Negative     Bilirubin Urine 10/10/2022 Negative     Ketones, Urine 10/10/2022 TRACE (A)     Specific [place], UA 10/10/2022 1.023     Blood, Urine 10/10/2022 Negative     pH, UA 10/10/2022 6.5     Protein, UA 10/10/2022 Negative     Urobilinogen, Urine 10/10/2022 0.2     Nitrite, Urine 10/10/2022 Negative     Leukocyte Esterase, Urine 10/10/2022 Negative     Cholesterol, Total 10/10/2022 186     Triglycerides 10/10/2022 128     HDL 10/10/2022 56 (A)     LDL Calculated 10/10/2022 104     Hemoglobin A1C 10/10/2022 5.6     WBC 10/10/2022 5.7     RBC 10/10/2022 5.31     Hemoglobin 10/10/2022 12.7     Hematocrit 10/10/2022 42.4     MCV 10/10/2022 79.8 (A)     MCH 10/10/2022 23.9 (A)     MCHC 10/10/2022 30.0 (A)     RDW 10/10/2022 17.0 (A)     Platelets  249     MPV 10/10/2022 9.4     Neutrophils % 10/10/2022 49.7 (A)     Lymphocytes % 10/10/2022 41.6 (A)     Monocytes % 10/10/2022 8.3     Eosinophils % 10/10/2022 0.0     Basophils % 10/10/2022 0.0     Neutrophils Absolute 10/10/2022 2.8     Immature Granulocytes # 10/10/2022 0.0     Lymphocytes Absolute 10/10/2022 2.4     Monocytes Absolute 10/10/2022 0.50     Eosinophils Absolute 10/10/2022 0.00     Basophils Absolute 10/10/2022 0.00     Sodium 10/10/2022 145     Potassium 10/10/2022 4.1     Chloride 10/10/2022 111     CO2 10/10/2022 21 (A)     Anion Gap 10/10/2022 13     Glucose 10/10/2022 112 (A)     BUN 10/10/2022 13     Creatinine 10/10/2022 0.7     GFR Non- 10/10/2022 >60     GFR  10/10/2022 >59     Calcium 10/10/2022 9.1     Total Protein 10/10/2022 6.8     Albumin 10/10/2022 4.8     Total Bilirubin 10/10/2022 0.4     Alkaline Phosphatase 10/10/2022 101     ALT 10/10/2022 13     AST 10/10/2022 17          Review of Systems   Constitutional:  Negative for chills, fatigue and fever. HENT:  Negative for congestion, ear pain, nosebleeds, postnasal drip and sore throat. Respiratory:  Negative for cough, chest tightness and wheezing. Cardiovascular:  Negative for chest pain, palpitations and leg swelling. Gastrointestinal:  Negative for abdominal pain and constipation. Genitourinary:  Negative for dysuria and urgency. Musculoskeletal: Negative. Negative for arthralgias. Skin:  Negative for rash. Neurological:  Negative for dizziness and headaches. Psychiatric/Behavioral: Negative. Vitals:    10/12/22 0759   BP: 120/80   Site: Left Upper Arm   Position: Sitting   Cuff Size: Large Adult   Pulse: 91   Resp: 18   SpO2: 98%   Weight: 203 lb (92.1 kg)   Height: 5' 6\" (1.676 m)      Wt Readings from Last 3 Encounters:   10/12/22 203 lb (92.1 kg)   02/08/22 194 lb (88 kg)   08/05/21 192 lb (87.1 kg)   Body mass index is 32.77 kg/m².   BP Readings from Last 3 Encounters: 10/12/22 120/80   02/08/22 128/80   08/05/21 110/80       Physical Exam  Constitutional:       Appearance: She is well-developed. HENT:      Right Ear: External ear normal.      Left Ear: External ear normal.      Mouth/Throat:      Pharynx: No oropharyngeal exudate. Eyes:      Conjunctiva/sclera: Conjunctivae normal.      Pupils: Pupils are equal, round, and reactive to light. Neck:      Thyroid: No thyromegaly. Vascular: No JVD. Cardiovascular:      Rate and Rhythm: Normal rate. Heart sounds: Normal heart sounds. No murmur heard. Pulmonary:      Effort: No respiratory distress. Breath sounds: Normal breath sounds. No wheezing or rales. Chest:      Chest wall: No tenderness. Abdominal:      General: Bowel sounds are normal.      Palpations: Abdomen is soft. Musculoskeletal:         General: Normal range of motion. Cervical back: Neck supple. Lymphadenopathy:      Cervical: No cervical adenopathy. Skin:     General: Skin is warm. Findings: No rash. Neurological:      Mental Status: She is oriented to person, place, and time. ASSESSMENT/PLAN  Annual physical  1. Mammogram- schedule  2. Colonoscopy for colon cancer screening due  Refuses 2021  Now agreeable  schedule  3. Pap smear 9/2020, repeat 3 years  4. Lipid screen, normal lipid level   5.   Diabetes screen A1c is 5.6     Postsurgical hypothyroidism     LAB DATA:        T4 Free 10/10/2022 1.83 (A)         TSH 10/10/2022 0.006 (A)            Orders Only on 02/01/2022   Component Date Value    TSH 02/01/2022 0.041*    T4 Free 02/01/2022 1.61       TSH 08/02/2021 0.007*           T4 Free 08/02/2021 1.69       T4 Free 01/22/2021 0.90*   TSH 01/22/2021 0.219*      T4 Free 08/20/2020 1.39    TSH 08/20/2020 0.009*      2/2022  Take synthroid 200 ug daily  + also 25 daily ( decrease form 37.5 in 10/2022     Generalized anxiety disorder  Overall has been doing well on current Effexor dose  Continue current plans     Obesity  Diet and weight loss DW in length with patient. I have advised patient to follow strict lower carbohydrate dietary regimen and engage in  exercise routine for 30-45 minutes at least 3-4  Days per week. Patient was provided with strategies how to  shift from personal maladaptive eating patterns toward healthful eating and exercise. . Behavioral therapy components of self monitoring, goal setting, stimulus control and social support were emphasized. Suggest trial with Ozempic or Mounjaro  RX sent  Will see if insurance convers     Elevated fasting blood sugar over 100 on fasting blood work  Hemoglobin A1c 5.6 in 10/2022   (5.6 in 1/2021 ( 5.6)  Long discussion with patient  Recommend following  1. Regular exercise  2. Lower carbohydrate diet, suggest diet lower in simple carbohydrates and no added sugar try to keep carb 60 g/day and not higher  3. Time restricted eating-explained and discussed with patient     Vitamin D level is 67 in 10/2022  Suggest continue multivitamin and take vitamin D 1000 - 2000 IU daily     Migraine syndrome  Takes rx topamax   Recently has had more headaches especially right temporal area  Recommend  Topamax change  Take Topamax 50 mg in a.m. Increase evening dose Topamax 150 mg  maxalt prn    Heavy periods  Mcv mild decline  Suggest take iron with each day of MP    Insomnia  Trail trazodone  RX sent         Orders Placed This Encounter   Procedures    LUDY DIGITAL SCREEN W OR WO CAD BILATERAL    TSH    T4, Free    Hemoglobin A1C    Deborah Shaw MD, Gastroenterology, [place]     New Prescriptions    SEMAGLUTIDE, 1 MG/DOSE, 4 MG/3ML SOPN    Inject 1 mg into the skin once a week    TRAZODONE (DESYREL) 50 MG TABLET    Take 1 tablet by mouth nightly as needed for Sleep      There are no Patient Instructions on file for this visit. Return in about 6 months (around 4/12/2023) for Medication check.    EMR Dragon/transcription disclaimer:Significant part of this encounter note is electronic transcription/translation of spoken language to printed text. The electronic translation of spoken language may beerroneous, or at times, nonsensical words or phrases may be inadvertently transcribed.  Although I have reviewed the note for such errors, some may still exist.

## 2022-11-07 RX ORDER — TRAZODONE HYDROCHLORIDE 50 MG/1
50 TABLET ORAL NIGHTLY PRN
Qty: 90 TABLET | Refills: 1 | Status: SHIPPED | OUTPATIENT
Start: 2022-11-07

## 2022-11-07 NOTE — TELEPHONE ENCOUNTER
Pt requests a 90 days supply  Patient needs refill on   Requested Prescriptions     Pending Prescriptions Disp Refills    traZODone (DESYREL) 50 MG tablet 90 tablet 1     Sig: Take 1 tablet by mouth nightly as needed for Sleep

## 2022-11-08 RX ORDER — LEVOTHYROXINE SODIUM 200 MCG
200 TABLET ORAL DAILY
Qty: 90 TABLET | Refills: 1 | OUTPATIENT
Start: 2022-11-08

## 2022-11-08 RX ORDER — LEVOTHYROXINE SODIUM 200 MCG
TABLET ORAL
Qty: 90 TABLET | Refills: 1 | Status: SHIPPED | OUTPATIENT
Start: 2022-11-08

## 2022-11-18 ENCOUNTER — HOSPITAL ENCOUNTER (OUTPATIENT)
Dept: WOMENS IMAGING | Age: 47
Discharge: HOME | End: 2022-11-18
Payer: COMMERCIAL

## 2022-11-18 VITALS — BODY MASS INDEX: 30.67 KG/M2 | WEIGHT: 190 LBS

## 2022-11-18 DIAGNOSIS — Z12.31 ENCOUNTER FOR SCREENING MAMMOGRAM FOR BREAST CANCER: ICD-10-CM

## 2022-11-18 PROCEDURE — 77063 BREAST TOMOSYNTHESIS BI: CPT

## 2022-12-12 RX ORDER — LEVOTHYROXINE SODIUM 25 MCG
TABLET ORAL
Qty: 135 TABLET | Refills: 1 | Status: SHIPPED | OUTPATIENT
Start: 2022-12-12

## 2022-12-12 NOTE — TELEPHONE ENCOUNTER
Minoo Mention called requesting a refill of the below medication which has been pended for you:     Requested Prescriptions     Pending Prescriptions Disp Refills    SYNTHROID 25 MCG tablet [Pharmacy Med Name: SYNTHROID 25 MCG TABLET] 135 tablet 1     Sig: TAKE 1 AND 1/2 TABLETS BY MOUTH EVERY DAY       Last Appointment Date: 10/12/2022  Next Appointment Date: 4/12/2023    Allergies   Allergen Reactions    E.E.S.  [Erythromycin]      Was told this as a Kid    Sulfa Antibiotics Rash

## 2022-12-13 ENCOUNTER — TELEPHONE (OUTPATIENT)
Dept: GASTROENTEROLOGY | Age: 47
End: 2022-12-13

## 2022-12-13 NOTE — TELEPHONE ENCOUNTER
Called patient to remind them of their procedure  at [facility] on 12/15/22 to arrive at 12:30PM     NO ANS / LM

## 2022-12-15 ENCOUNTER — HOSPITAL ENCOUNTER (OUTPATIENT)
Age: 47
Setting detail: OUTPATIENT SURGERY
Discharge: HOME | End: 2022-12-15
Attending: INTERNAL MEDICINE | Admitting: INTERNAL MEDICINE

## 2022-12-15 ENCOUNTER — ANESTHESIA (OUTPATIENT)
Dept: OPERATING ROOM | Age: 47
End: 2022-12-15

## 2022-12-15 ENCOUNTER — ANESTHESIA EVENT (OUTPATIENT)
Dept: OPERATING ROOM | Age: 47
End: 2022-12-15

## 2022-12-15 ENCOUNTER — APPOINTMENT (OUTPATIENT)
Dept: OPERATING ROOM | Age: 47
End: 2022-12-15

## 2022-12-15 VITALS
WEIGHT: 185 LBS | OXYGEN SATURATION: 98 % | BODY MASS INDEX: 29.73 KG/M2 | HEIGHT: 66 IN | DIASTOLIC BLOOD PRESSURE: 57 MMHG | RESPIRATION RATE: 20 BRPM | TEMPERATURE: 98 F | SYSTOLIC BLOOD PRESSURE: 110 MMHG | HEART RATE: 82 BPM

## 2022-12-15 PROCEDURE — G0121 COLON CA SCRN NOT HI RSK IND: HCPCS

## 2022-12-15 RX ORDER — PROPOFOL 10 MG/ML
INJECTION, EMULSION INTRAVENOUS PRN
Status: DISCONTINUED | OUTPATIENT
Start: 2022-12-15 | End: 2022-12-15 | Stop reason: SDUPTHER

## 2022-12-15 RX ORDER — ONDANSETRON 2 MG/ML
4 INJECTION INTRAMUSCULAR; INTRAVENOUS
Status: CANCELLED | OUTPATIENT
Start: 2022-12-15 | End: 2022-12-16

## 2022-12-15 RX ORDER — SODIUM CHLORIDE, SODIUM LACTATE, POTASSIUM CHLORIDE, CALCIUM CHLORIDE 600; 310; 30; 20 MG/100ML; MG/100ML; MG/100ML; MG/100ML
INJECTION, SOLUTION INTRAVENOUS CONTINUOUS
Status: DISCONTINUED | OUTPATIENT
Start: 2022-12-15 | End: 2022-12-15 | Stop reason: HOSPADM

## 2022-12-15 RX ORDER — LIDOCAINE HYDROCHLORIDE 10 MG/ML
INJECTION, SOLUTION EPIDURAL; INFILTRATION; INTRACAUDAL; PERINEURAL PRN
Status: DISCONTINUED | OUTPATIENT
Start: 2022-12-15 | End: 2022-12-15 | Stop reason: SDUPTHER

## 2022-12-15 RX ORDER — ONDANSETRON 4 MG/1
4 TABLET, ORALLY DISINTEGRATING ORAL 3 TIMES DAILY PRN
Qty: 90 TABLET | Refills: 3 | Status: SHIPPED | OUTPATIENT
Start: 2022-12-15

## 2022-12-15 RX ORDER — DIPHENHYDRAMINE HYDROCHLORIDE 50 MG/ML
12.5 INJECTION INTRAMUSCULAR; INTRAVENOUS
Status: CANCELLED | OUTPATIENT
Start: 2022-12-15 | End: 2022-12-16

## 2022-12-15 RX ADMIN — SODIUM CHLORIDE, SODIUM LACTATE, POTASSIUM CHLORIDE, CALCIUM CHLORIDE: 600; 310; 30; 20 INJECTION, SOLUTION INTRAVENOUS at 08:42

## 2022-12-15 RX ADMIN — LIDOCAINE HYDROCHLORIDE 30 MG: 10 INJECTION, SOLUTION EPIDURAL; INFILTRATION; INTRACAUDAL; PERINEURAL at 09:33

## 2022-12-15 RX ADMIN — PROPOFOL 400 MG: 10 INJECTION, EMULSION INTRAVENOUS at 09:33

## 2022-12-15 ASSESSMENT — PAIN - FUNCTIONAL ASSESSMENT: PAIN_FUNCTIONAL_ASSESSMENT: 0-10

## 2022-12-15 NOTE — DISCHARGE INSTRUCTIONS
Recommendations:  1. Repeat colonoscopy: 10 years    POST-OP ORDERS: ENDOSCOPY & COLONOSCOPY:    1. Rest today. 2. DO NOT eat or drink until wide awake; eat your usual diet today in moderate amount only. 3. DO NOT drive today. 4. Call physician if you have severe pain, vomiting, fever, rectal bleeding or black bowel movements. 5.  If a biopsy was taken or a polyp removed, you should expect to hear results in about 21 days. If you have heard nothing from your physician by then, call the office for results. 6.  Discharge home when patient awake, vitals signs stable and tolerating liquids.

## 2022-12-15 NOTE — ANESTHESIA POSTPROCEDURE EVALUATION
Department of Anesthesiology  Postprocedure Note    Patient: Tanvir Nunn  MRN: [id number]  YOB: 1975  Date of evaluation: 12/15/2022      Procedure Summary     Date: 12/15/22 Room / Location: formerly [facility] 02 / [address]    Anesthesia Start: 1198 Anesthesia Stop: 5713    Procedure: COLORECTAL CANCER SCREENING, NOT HIGH RISK (Abdomen) Diagnosis:       Colon cancer screening      (Colon cancer screening [Z12.11])    Surgeons: Thelma Love MD Responsible Provider: RICHA Hernández CRNA    Anesthesia Type: General ASA Status: 2          Anesthesia Type: General    Svetlana Phase I:      Svetlana Phase II: Svetlana Score: 10      Anesthesia Post Evaluation    Patient location during evaluation: bedside  Patient participation: complete - patient participated  Level of consciousness: awake  Pain score: 0  Airway patency: patent  Nausea & Vomiting: no nausea and no vomiting  Complications: no  Cardiovascular status: hemodynamically stable  Respiratory status: acceptable, room air and spontaneous ventilation  Hydration status: euvolemic

## 2022-12-15 NOTE — ANESTHESIA PRE PROCEDURE
Daily Note     Today's date: 2020  Patient name: Vance Madrid  : 1966  MRN: [id number]  Referring provider: Yoanna Walker MD  Dx:   Encounter Diagnosis     ICD-10-CM    1  Dizziness R42    2  Cervical muscle pain M54 2                   Subjective: Pt reports she only had 1 episode of the muscle "ceasing" since last session  Pt has gone from pain all day and >6 episodes of the SCM going into spasm, to intermittent pain and only 1 episode in 5 days  Pt did report she started to feel tightness in the left SCM over the weekend  Objective: See treatment diary below      Assessment: Tolerated treatment well  Patient making good progress  Increased exercise tolerance (pt had been unable to exercise for 9 months), no dizziness, significant pain reduction and decreased spasm  Plan: Continue per plan of care        Precautions: standard      Manual  1/21 1/24 1/28 12/24 12/27 12/31 1/3 1/7 1/14 1/17   Cervical ROM  SCM stretch  U  Traps stretch  distraction 20 25 25 30 30 30 30' 30' 30' 30   IAS  5                                                      Exercise Diary  1/21 1/24 1/28 12/24 12/27 12/31 1/3 1/7 1/14 1/17   HEP             row 35#/ 30 NT 35#/ 30 20x 20x 20x 20x NT 30x 35#/ 30   posture row 35#/ 30 NT 35#/ 30 20#/ 30 20#/ 30 20#/ 30 20#/ 30 NT 30#/ 30 35#/ 30   UBE 6' NT 6' 4 4 5' 6' NT 6' 6'   cervical ext iso 30x NT 30x 10x 10x 10x 10 NT 30x 30x   B shoulder RTC iso 30x NT 30x ea  20x ER/IR/ext 20x 20x NT 30x  B 30x   bicep 35#/ 30 NT 35#/ 30      30#/ 30 35#/ 30   tricep 50#/ 30 NT 50#/ 30      45#/ 30 50#/ 30   [place] 3,4 20x NT 20x      15x ea 20x   [place] 30"x3 NT 30"x3      30"x3  FF 30"x3                                                                                                                                     HEP- anterior chest opening on Swiss ball, SCM stretch    Modalities  1/21 1/24 1/28  12/27 12/31 1/3 1/7 1/14 1/17   mech tx 15 10' 10'  18 static  18#  15' Department of Anesthesiology  Preprocedure Note       Name:  Ambrosio Bain   Age:  52 y.o.  :  1975                                          MRN:  [id number]         Date:  12/15/2022      Surgeon: Enid Mcknight):  Mily Godfrey MD    Procedure: Procedure(s):  COLORECTAL CANCER SCREENING, NOT HIGH RISK    Medications prior to admission:   Prior to Admission medications    Medication Sig Start Date End Date Taking? Authorizing Provider   traZODone (DESYREL) 50 MG tablet Take 1 tablet by mouth nightly as needed for Sleep 22   Jacqueline Orozco MD   SYNTHROID 25 MCG tablet TAKE 1 AND 1/2 TABLETS BY MOUTH EVERY DAY 22   Jacqueline Orozco MD   SYNTHROID 200 MCG tablet TAKE 1 TABLET BY MOUTH EVERY DAY 22   Jacqueline Orozco MD   Cholecalciferol (VITAMIN D) 50 MCG (2000) CAPS capsule Take by mouth    Historical Provider, MD   Semaglutide, 1 MG/DOSE, 4 MG/3ML SOPN Inject 1 mg into the skin once a week 10/12/22   Jacqueline Orozco MD   topiramate (TOPAMAX) 50 MG tablet Take 1 tablet by mouth 2 times daily 22   Jacqueline Orozco MD   topiramate (TOPAMAX) 100 MG tablet Take 1 tablet by mouth 2 times daily 22   Jacqueline Orozco MD   venlafaxine (EFFEXOR XR) 150 MG extended release capsule Take 1 capsule by mouth daily 22   Jacqueline Orozco MD       Current medications:    Current Facility-Administered Medications   Medication Dose Route Frequency Provider Last Rate Last Admin    lactated ringers infusion   IntraVENous Continuous Mily Godfrey MD           Allergies: Allergies   Allergen Reactions    E.E.S.  [Erythromycin]      Was told this as a Kid    Sulfa Antibiotics Rash       Problem List:    Patient Active Problem List   Diagnosis Code    Generalized anxiety disorder F41.1    Postsurgical hypothyroidism E89.0       Past Medical History:        Diagnosis Date    Anxiety     H/O partial thyroidectomy     Nithin Sandoval    Hypothyroidism        Past Surgical History:        Procedure Laterality Date     15' 15 15 15 15 SECTION  2008    FINGER SURGERY      Thumb surgery 2001 (Broke playing softball)    KNEE SURGERY      multiple scopes left knee    THYROIDECTOMY, PARTIAL  2001    Nithin Asndoval benign mass       Social History:    Social History     Tobacco Use    Smoking status: Never    Smokeless tobacco: Never   Substance Use Topics    Alcohol use: Not Currently                                Counseling given: Not Answered      Vital Signs (Current):   Vitals:    12/15/22 0829   BP: 134/86   Pulse: 91   Resp: 17   Temp: 97.7 °F (36.5 °C)   TempSrc: Temporal   SpO2: 98%   Weight: 185 lb (83.9 kg)   Height: 5' 6\" (1.676 m)                                              BP Readings from Last 3 Encounters:   12/15/22 134/86   10/12/22 120/80   02/08/22 128/80       NPO Status:                                                                                 BMI:   Wt Readings from Last 3 Encounters:   12/15/22 185 lb (83.9 kg)   11/18/22 190 lb (86.2 kg)   10/12/22 203 lb (92.1 kg)     Body mass index is 29.86 kg/m².     CBC:   Lab Results   Component Value Date/Time    WBC 5.7 10/10/2022 09:09 AM    RBC 5.31 10/10/2022 09:09 AM    HGB 12.7 10/10/2022 09:09 AM    HCT 42.4 10/10/2022 09:09 AM    MCV 79.8 10/10/2022 09:09 AM    RDW 17.0 10/10/2022 09:09 AM     10/10/2022 09:09 AM       CMP:   Lab Results   Component Value Date/Time     10/10/2022 09:09 AM    K 4.1 10/10/2022 09:09 AM     10/10/2022 09:09 AM    CO2 21 10/10/2022 09:09 AM    BUN 13 10/10/2022 09:09 AM    CREATININE 0.7 10/10/2022 09:09 AM    GFRAA >59 10/10/2022 09:09 AM    LABGLOM >60 10/10/2022 09:09 AM    GLUCOSE 112 10/10/2022 09:09 AM    PROT 6.8 10/10/2022 09:09 AM    CALCIUM 9.1 10/10/2022 09:09 AM    BILITOT 0.4 10/10/2022 09:09 AM    ALKPHOS 101 10/10/2022 09:09 AM    AST 17 10/10/2022 09:09 AM    ALT 13 10/10/2022 09:09 AM       POC Tests: No results for input(s): POCGLU, POCNA, POCK, POCCL, POCBUN, POCHEMO, POCHCT in the last 72 hours.    Coags: No results found for: PROTIME, INR, APTT    HCG (If Applicable): No results found for: PREGTESTUR, PREGSERUM, HCG, HCGQUANT     ABGs: No results found for: PHART, PO2ART, IJH5ITG, NRK0HEQ, BEART, F1QTVTPR     Type & Screen (If Applicable):  No results found for: LABABO, LABRH    Drug/Infectious Status (If Applicable):  No results found for: HIV, HEPCAB    COVID-19 Screening (If Applicable): No results found for: COVID19        Anesthesia Evaluation  Patient summary reviewed and Nursing notes reviewed  Airway: Mallampati: II  TM distance: <3 FB   Neck ROM: full  Mouth opening: > = 3 FB   Dental: normal exam         Pulmonary:normal exam  breath sounds clear to auscultation                             Cardiovascular:  Exercise tolerance: good (>4 METS),           Rhythm: regular  Rate: normal           Beta Blocker:  Not on Beta Blocker         Neuro/Psych:   (+) psychiatric history:depression/anxiety             GI/Hepatic/Renal:             Endo/Other:    (+) hypothyroidism::., .                 Abdominal:       Abdomen: soft. Vascular: Other Findings:           Anesthesia Plan      general and TIVA     ASA 2       Induction: intravenous. Anesthetic plan and risks discussed with patient.                         RICHA Zamora CRNA   12/15/2022

## 2022-12-15 NOTE — H&P
Patient Name: Luis Angel Love  : 1975  MRN: [id number]  DATE: 12/15/22    Allergies: Allergies   Allergen Reactions    E.E.S. [Erythromycin]      Was told this as a Kid    Sulfa Antibiotics Rash        ENDOSCOPY  History and Physical    Procedure:    [] Diagnostic Colonoscopy       [x] Screening Colonoscopy  [] EGD      [] ERCP      [] EUS       [] Other    [x] Previous office notes/History and Physical reviewed from the patients chart. Please see EMR for further details of HPI. I have examined the patient's status immediately prior to the procedure and:      Indications/HPI:       [x] Screening              [] History of Polyps      []Fhx of colon CA/polyps []+Cologard/DNA/Stool Testing      Anesthesia:   [x] MAC [] Moderate Sedation   [] General   [] None     ROS: 12 pt review of systems was negative unless stated above    Medications:   Prior to Admission medications    Medication Sig Start Date End Date Taking?  Authorizing Provider   traZODone (DESYREL) 50 MG tablet Take 1 tablet by mouth nightly as needed for Sleep 22   Rubin Kessler MD   SYNTHROID 25 MCG tablet TAKE 1 AND 1/2 TABLETS BY MOUTH EVERY DAY 22   Rubin Kessler MD   SYNTHROID 200 MCG tablet TAKE 1 TABLET BY MOUTH EVERY DAY 22   Rubin Kessler MD   Cholecalciferol (VITAMIN D) 50 MCG ( UT) CAPS capsule Take by mouth    Historical Provider, MD   Semaglutide, 1 MG/DOSE, 4 MG/3ML SOPN Inject 1 mg into the skin once a week 10/12/22   Rubin Kessler MD   topiramate (TOPAMAX) 50 MG tablet Take 1 tablet by mouth 2 times daily 22   Rubin Kessler MD   topiramate (TOPAMAX) 100 MG tablet Take 1 tablet by mouth 2 times daily 22   Rubin Kessler MD   venlafaxine (EFFEXOR XR) 150 MG extended release capsule Take 1 capsule by mouth daily 22   Rubin Kessler MD       Past Medical History:  Past Medical History:   Diagnosis Date    Anxiety     H/O partial thyroidectomy     Nithin Sandoval    Hypothyroidism        Past Surgical History:  Past Surgical History:   Procedure Laterality Date     SECTION  2008    FINGER SURGERY      Thumb surgery  (Broke playing softball)    KNEE SURGERY      multiple scopes left knee    THYROIDECTOMY, PARTIAL      Nithinharper Sandoval benign mass       Social History:  Social History     Tobacco Use    Smoking status: Never    Smokeless tobacco: Never   Substance Use Topics    Alcohol use: Not Currently    Drug use: Never       Vital Signs:   Vitals:    12/15/22 0829   BP: 134/86   Pulse: 91   Resp: 17   Temp: 97.7 °F (36.5 °C)   SpO2: 98%        Physical Exam:  Cardiac:  [x]WNL []Comments:  Pulmonary:  [x]WNL   []Comments:  Neuro/Mental Status:  [x]WNL  []Comments:  Abdominal:  [x]WNL    []Comments:  Other:   []WNL  []Comments:    Informed Consent:  The risks and benefits of the procedure have been discussed with either the patient or if they cannot consent, their representative. Assessment:  Patient examined and appropriate for planned sedation and procedure. Plan:  Proceed with planned sedation and procedure as above. Dolly Doll am scribing for and in the presence of Dr. Dilip Mike MD.  Electronically signed by Jocelyn Crum RN on 12/15/2022 at 8:59 AM    I personally performed the services described in this documentation as scribed by Frank Lane, and it appears accurate and complete.      Dilip Mike MD  12/15/2022

## 2022-12-15 NOTE — OP NOTE
Patient: Saranya Verduzco : 1975  Med Rec#: [id number] Acc#: [id number]   Primary Care Provider Murphy Huddleston MD    Date of Procedure:  12/15/2022    Endoscopist: Daved Cushing, MD    Referring Provider: Murphy Huddleston MD    Operation Performed: Colonoscopy     Indications: Screening    Anesthesia:  Sedation was administered by anesthesia who monitored the patient during the procedure. I met with Saranya Verduzco prior to procedure. We discussed the procedure itself, and I have discussed the risks of endoscopy (including-- but not limited to-- pain, discomfort, bleeding potentially requiring second endoscopic procedure and/or blood transfusion, organ perforation requiring operative repair, damage to organs near the colon, infection, aspiration, cardiopulmonary/allergic reaction), benefits, indications to endoscopy. Additionally, we discussed options other than colonoscopy. The patient expressed understanding. All questions answered. The patient decided to proceed with the procedure. Signed informed consent was placed on the chart. Blood Loss: minimal    Withdrawal time: > 6 min  Bowel Prep: adequate     Complications: no immediate complications    DESCRIPTION OF PROCEDURE:     A time out was performed. After written informed consent was obtained, the patient was placed in the left lateral position. The perianal area was inspected, and a digital rectal exam was performed. A rectal exam was performed: normal tone, no palpable lesions. At this point, a forward viewing Olympus colonoscope was inserted into the anus and carefully advanced to the cecum. The cecum was identified by the ileocecal valve and the appendiceal orifice. The colonoscope was then slowly withdrawn with careful inspection of the mucosa in a linear and circumferential fashion. The scope was retroflexed in the rectum. Suction was utilized during the procedure to remove as much air as possible from the bowel.  The colonoscope was removed from the patient, and the procedure was terminated. Findings are listed below. Findings: The mucosa appeared normal throughout the entire examined colon. Retroflexion in the rectum was normal and revealed no further abnormalities. Recommendations:  1. Repeat colonoscopy: 10 years    Findings and recommendations were discussed w/ the patient. A copy of the images was provided. Luh Cantu am scribing for and in the presence of Dr. Marvin Walter MD.  Electronically signed by Spencer Ramirez RN on 12/15/2022 at 8:59 AM    I personally performed the services described in this documentation as scribed by Aleksandra Olvera, and it appears accurate and complete.      Marvin Walter MD  12/15/2022

## 2022-12-22 RX ORDER — TOPIRAMATE 50 MG/1
50 TABLET, FILM COATED ORAL 2 TIMES DAILY
Qty: 180 TABLET | Refills: 1 | Status: SHIPPED | OUTPATIENT
Start: 2022-12-22

## 2022-12-22 NOTE — TELEPHONE ENCOUNTER
Galileo Lee called requesting a refill of the below medication which has been pended for you:     Requested Prescriptions     Pending Prescriptions Disp Refills    topiramate (TOPAMAX) 50 MG tablet 180 tablet 1     Sig: Take 1 tablet by mouth 2 times daily       Last Appointment Date: 10/12/2022  Next Appointment Date: 4/12/2023    Allergies   Allergen Reactions    E.E.S.  [Erythromycin]      Was told this as a Kid    Sulfa Antibiotics Rash

## 2023-01-10 DIAGNOSIS — R73.01 IFG (IMPAIRED FASTING GLUCOSE): ICD-10-CM

## 2023-01-10 DIAGNOSIS — E66.09 EXOGENOUS OBESITY: ICD-10-CM

## 2023-01-10 NOTE — TELEPHONE ENCOUNTER
Anabell Minion called requesting a refill of the below medication which has been pended for you:     Requested Prescriptions     Pending Prescriptions Disp Refills    Semaglutide, 1 MG/DOSE, 4 MG/3ML SOPN 3 mL 2     Sig: Inject 1 mg into the skin once a week       Last Appointment Date: 10/12/2022  Next Appointment Date: 4/12/2023    Allergies   Allergen Reactions    E.E.S.  [Erythromycin]      Was told this as a Kid    Sulfa Antibiotics Rash

## 2023-02-14 RX ORDER — LEVOTHYROXINE SODIUM 200 MCG
200 TABLET ORAL DAILY
Qty: 90 TABLET | Refills: 1 | Status: SHIPPED | OUTPATIENT
Start: 2023-02-14

## 2023-02-14 NOTE — TELEPHONE ENCOUNTER
Jaleesa Tipton called requesting a refill of the below medication which has been pended for you:     Requested Prescriptions     Pending Prescriptions Disp Refills    SYNTHROID 200 MCG tablet 90 tablet 1     Sig: Take 1 tablet by mouth Daily       Last Appointment Date: 10/12/2022  Next Appointment Date: 4/12/2023    Allergies   Allergen Reactions    E.E.S.  [Erythromycin]      Was told this as a Kid    Sulfa Antibiotics Rash

## 2023-02-25 RX ORDER — TOPIRAMATE 50 MG/1
50 TABLET, FILM COATED ORAL 2 TIMES DAILY
Qty: 180 TABLET | Refills: 1 | Status: SHIPPED | OUTPATIENT
Start: 2023-02-25

## 2023-02-25 RX ORDER — TOPIRAMATE 100 MG/1
100 TABLET, FILM COATED ORAL 2 TIMES DAILY
Qty: 180 TABLET | Refills: 0 | Status: SHIPPED | OUTPATIENT
Start: 2023-02-25

## 2023-02-27 SDOH — ECONOMIC STABILITY: HOUSING INSECURITY
IN THE LAST 12 MONTHS, WAS THERE A TIME WHEN YOU DID NOT HAVE A STEADY PLACE TO SLEEP OR SLEPT IN A SHELTER (INCLUDING NOW)?: NO

## 2023-02-27 SDOH — ECONOMIC STABILITY: FOOD INSECURITY: WITHIN THE PAST 12 MONTHS, THE FOOD YOU BOUGHT JUST DIDN'T LAST AND YOU DIDN'T HAVE MONEY TO GET MORE.: NEVER TRUE

## 2023-02-27 SDOH — ECONOMIC STABILITY: FOOD INSECURITY: WITHIN THE PAST 12 MONTHS, YOU WORRIED THAT YOUR FOOD WOULD RUN OUT BEFORE YOU GOT MONEY TO BUY MORE.: NEVER TRUE

## 2023-02-27 SDOH — ECONOMIC STABILITY: INCOME INSECURITY: HOW HARD IS IT FOR YOU TO PAY FOR THE VERY BASICS LIKE FOOD, HOUSING, MEDICAL CARE, AND HEATING?: NOT HARD AT ALL

## 2023-02-28 ENCOUNTER — OFFICE VISIT (OUTPATIENT)
Dept: INTERNAL MEDICINE | Age: 48
End: 2023-02-28
Payer: COMMERCIAL

## 2023-02-28 VITALS
HEART RATE: 79 BPM | WEIGHT: 195 LBS | DIASTOLIC BLOOD PRESSURE: 78 MMHG | BODY MASS INDEX: 31.34 KG/M2 | OXYGEN SATURATION: 97 % | SYSTOLIC BLOOD PRESSURE: 122 MMHG | HEIGHT: 66 IN | RESPIRATION RATE: 18 BRPM

## 2023-02-28 DIAGNOSIS — E66.09 EXOGENOUS OBESITY: Primary | ICD-10-CM

## 2023-02-28 PROCEDURE — 99213 OFFICE O/P EST LOW 20 MIN: CPT | Performed by: INTERNAL MEDICINE

## 2023-02-28 ASSESSMENT — ENCOUNTER SYMPTOMS
ABDOMINAL PAIN: 0
WHEEZING: 0
COUGH: 0
SORE THROAT: 0
CONSTIPATION: 0
CHEST TIGHTNESS: 0

## 2023-02-28 NOTE — PROGRESS NOTES
21     Chief Complaint   Patient presents with    Weight Management     History of presenting illness:  Berna Lam is a55 y.o. female who presents today for follow up on her chronic medical conditions as noted below. Patient Active Problem List    Diagnosis Date Noted    Generalized anxiety disorder 2020    Postsurgical hypothyroidism 2020     Past Medical History:   Diagnosis Date    Anxiety     H/O partial thyroidectomy     Nithin Sandoval    Hypothyroidism       Past Surgical History:   Procedure Laterality Date     SECTION      COLONOSCOPY N/A 12/15/2022    Dr Claudia Green, 10 yr recall    FINGER SURGERY      Thumb surgery (Broke playing softball)    KNEE SURGERY Left     multiple scopes    THYROIDECTOMY, PARTIAL      Nithin Sandoval benign mass     Current Outpatient Medications   Medication Sig Dispense Refill    traZODone (DESYREL) 50 MG tablet Take 1 tablet by mouth nightly as needed for Sleep 90 tablet 1    topiramate (TOPAMAX) 100 MG tablet Take 1 tablet by mouth 2 times daily 180 tablet 0    topiramate (TOPAMAX) 50 MG tablet Take 1 tablet by mouth 2 times daily 180 tablet 1    SYNTHROID 200 MCG tablet Take 1 tablet by mouth Daily 90 tablet 1    Semaglutide, 1 MG/DOSE, 4 MG/3ML SOPN Inject 1 mg into the skin once a week 3 mL 2    ondansetron (ZOFRAN-ODT) 4 MG disintegrating tablet Take 1 tablet by mouth 3 times daily as needed for Nausea or Vomiting 90 tablet 3    SYNTHROID 25 MCG tablet TAKE 1 AND 1/2 TABLETS BY MOUTH EVERY  tablet 1    Cholecalciferol (VITAMIN D) 50 MCG ( UT) CAPS capsule Take by mouth      venlafaxine (EFFEXOR XR) 150 MG extended release capsule Take 1 capsule by mouth daily 90 capsule 0     No current facility-administered medications for this visit. Allergies   Allergen Reactions    E.E.S.  [Erythromycin]      Was told this as a Kid    Sulfa Antibiotics Rash     Social History     Tobacco Use    Smoking status: Never    Smokeless tobacco: Never   Substance Use Topics    Alcohol use: Not Currently      Family History   Problem Relation Age of Onset    Atrial Fibrillation Mother     Stroke Mother     Diabetes Father     Prostate Cancer Father        Review of Systems   Constitutional:  Positive for fatigue. Negative for chills and fever. HENT:  Negative for congestion, ear pain, nosebleeds, postnasal drip and sore throat. Respiratory:  Negative for cough, chest tightness and wheezing. Cardiovascular:  Negative for chest pain, palpitations and leg swelling. Gastrointestinal:  Negative for abdominal pain and constipation. Genitourinary:  Negative for dysuria and urgency. Musculoskeletal: Negative. Negative for arthralgias. Skin:  Negative for rash. Neurological:  Negative for dizziness and headaches. Psychiatric/Behavioral: Negative. Vitals:    02/28/23 1651   Weight: 195 lb (88.5 kg)     Body mass index is 31.47 kg/m². Physical Exam  Constitutional:       Appearance: She is well-developed. She is obese. HENT:      Right Ear: External ear normal.      Left Ear: External ear normal.      Mouth/Throat:      Pharynx: No oropharyngeal exudate. Eyes:      Conjunctiva/sclera: Conjunctivae normal.      Pupils: Pupils are equal, round, and reactive to light. Neck:      Thyroid: No thyromegaly. Vascular: No JVD. Cardiovascular:      Rate and Rhythm: Normal rate. Heart sounds: Normal heart sounds. No murmur heard. Pulmonary:      Effort: No respiratory distress. Breath sounds: Normal breath sounds. No wheezing or rales. Chest:      Chest wall: No tenderness. Abdominal:      General: Bowel sounds are normal.      Palpations: Abdomen is soft. Musculoskeletal:      Cervical back: Neck supple. Lymphadenopathy:      Cervical: No cervical adenopathy. Skin:     General: Skin is warm. Findings: No rash. Neurological:      Mental Status: She is oriented to person, place, and time.        Lab Review   No visits with results within 2 Month(s) from this visit.    Latest known visit with results is:   Orders Only on 10/10/2022   Component Date Value    T4 Free 10/10/2022 1.83 (A)     Vit D, 25-Hydroxy 10/10/2022 67.0     TSH 10/10/2022 0.006 (A)     Color, UA 10/10/2022 DARK YELLOW (A)     Clarity, UA 10/10/2022 Clear     Glucose, Ur 10/10/2022 Negative     Bilirubin Urine 10/10/2022 Negative     Ketones, Urine 10/10/2022 TRACE (A)     Specific [place], UA 10/10/2022 1.023     Blood, Urine 10/10/2022 Negative     pH, UA 10/10/2022 6.5     Protein, UA 10/10/2022 Negative     Urobilinogen, Urine 10/10/2022 0.2     Nitrite, Urine 10/10/2022 Negative     Leukocyte Esterase, Urine 10/10/2022 Negative     Cholesterol, Total 10/10/2022 186     Triglycerides 10/10/2022 128     HDL 10/10/2022 56 (A)     LDL Calculated 10/10/2022 104     Hemoglobin A1C 10/10/2022 5.6     WBC 10/10/2022 5.7     RBC 10/10/2022 5.31     Hemoglobin 10/10/2022 12.7     Hematocrit 10/10/2022 42.4     MCV 10/10/2022 79.8 (A)     MCH 10/10/2022 23.9 (A)     MCHC 10/10/2022 30.0 (A)     RDW 10/10/2022 17.0 (A)     Platelets 62/38/4983 249     MPV 10/10/2022 9.4     Neutrophils % 10/10/2022 49.7 (A)     Lymphocytes % 10/10/2022 41.6 (A)     Monocytes % 10/10/2022 8.3     Eosinophils % 10/10/2022 0.0     Basophils % 10/10/2022 0.0     Neutrophils Absolute 10/10/2022 2.8     Immature Granulocytes # 10/10/2022 0.0     Lymphocytes Absolute 10/10/2022 2.4     Monocytes Absolute 10/10/2022 0.50     Eosinophils Absolute 10/10/2022 0.00     Basophils Absolute 10/10/2022 0.00     Sodium 10/10/2022 145     Potassium 10/10/2022 4.1     Chloride 10/10/2022 111     CO2 10/10/2022 21 (A)     Anion Gap 10/10/2022 13     Glucose 10/10/2022 112 (A)     BUN 10/10/2022 13     Creatinine 10/10/2022 0.7     GFR Non- 10/10/2022 >60     GFR  10/10/2022 >59     Calcium 10/10/2022 9.1     Total Protein 10/10/2022 6.8     Albumin 10/10/2022 4.8     Total Bilirubin 10/10/2022 0.4     Alkaline Phosphatase 10/10/2022 101     ALT 10/10/2022 13     AST 10/10/2022 17            ASSESSMENT/PLAN:    Obesity  Patient was able to start Ozempic in October 2022  Was able to lose weight  Starting January Ozempic no longer covered  We will be trying if wegovy is covered on her insurance  Sample given to the patient  Take 0.25 weekly x2 weeks, then 0.5 and then go to 1 mg dose  Rx for 1 mg sent to the pharmacy  Diet and weight loss DW in length with patient. I have advised patient to follow strict lower carbohydrate dietary regimen and engage in  exercise routine for 30-45 minutes at least 3-4  Days per week. Patient was provided with strategies how to  shift from personal maladaptive eating patterns toward healthful eating and exercise.. Behavioral therapy components of self monitoring, goal setting, stimulus control and social support were emphasized.           No orders of the defined types were placed in this encounter.    New Prescriptions    No medications on file         No follow-ups on file.   There are no Patient Instructions on file for this visit.  EMR Dragon/transcription disclaimer:Significant part of this  encounter note is electronic transcription/translationof spoken language to printed text. The electronic translation of spoken language may be erroneous, or at times, nonsensical words or phrases may be inadvertently transcribed. Although I have reviewed the note for sucherrors, some may still exist.

## 2023-03-03 ENCOUNTER — PATIENT MESSAGE (OUTPATIENT)
Dept: INTERNAL MEDICINE | Age: 48
End: 2023-03-03

## 2023-03-03 DIAGNOSIS — E66.09 EXOGENOUS OBESITY: Primary | ICD-10-CM

## 2023-03-13 NOTE — TELEPHONE ENCOUNTER
From: Evaristo Uribe  To: Dr. Suazo Cheli: 3/3/2023 9:06 AM CST  Subject: Joleen Gordon    Good morning, just wanted to let you know the Wegovy injection wasnt covered by my insurance either. Is there anything else we could possibly try, even in pill form?      Thanks in advance

## 2023-03-30 RX ORDER — PHENTERMINE HYDROCHLORIDE 37.5 MG/1
37.5 TABLET ORAL
Qty: 30 TABLET | Refills: 0 | Status: SHIPPED | OUTPATIENT
Start: 2023-03-30 | End: 2023-04-29

## 2023-03-30 RX ORDER — VENLAFAXINE HYDROCHLORIDE 150 MG/1
150 CAPSULE, EXTENDED RELEASE ORAL DAILY
Qty: 90 CAPSULE | Refills: 0 | Status: SHIPPED | OUTPATIENT
Start: 2023-03-30

## 2023-04-10 DIAGNOSIS — E66.09 EXOGENOUS OBESITY: ICD-10-CM

## 2023-04-10 DIAGNOSIS — Z12.31 ENCOUNTER FOR SCREENING MAMMOGRAM FOR BREAST CANCER: ICD-10-CM

## 2023-04-10 DIAGNOSIS — E89.0 POSTSURGICAL HYPOTHYROIDISM: ICD-10-CM

## 2023-04-10 DIAGNOSIS — Z12.11 SCREEN FOR COLON CANCER: ICD-10-CM

## 2023-04-10 DIAGNOSIS — Z00.00 ANNUAL PHYSICAL EXAM: ICD-10-CM

## 2023-04-10 DIAGNOSIS — F41.1 GENERALIZED ANXIETY DISORDER: ICD-10-CM

## 2023-04-10 DIAGNOSIS — E55.9 VITAMIN D DEFICIENCY: ICD-10-CM

## 2023-04-10 LAB
HBA1C MFR BLD: 5.2 % (ref 4–6)
T4 FREE SERPL-MCNC: 1.74 NG/DL (ref 0.93–1.7)
TSH SERPL DL<=0.005 MIU/L-ACNC: 0.01 UIU/ML (ref 0.27–4.2)

## 2023-05-05 ENCOUNTER — PATIENT MESSAGE (OUTPATIENT)
Dept: INTERNAL MEDICINE | Age: 48
End: 2023-05-05

## 2023-05-05 DIAGNOSIS — E66.09 EXOGENOUS OBESITY: ICD-10-CM

## 2023-05-05 RX ORDER — PHENTERMINE HYDROCHLORIDE 37.5 MG/1
37.5 TABLET ORAL
Qty: 30 TABLET | Refills: 0 | Status: SHIPPED | OUTPATIENT
Start: 2023-05-05 | End: 2023-06-04

## 2023-05-05 NOTE — TELEPHONE ENCOUNTER
From: Cy Zapien  To: Dr. Gomez Seats: 5/5/2023 10:09 AM CDT  Subject: Phentermine    I am requesting a refill in this prescription. Not currently having any side effects or troble with this medicine.    Thank you

## 2023-05-06 NOTE — TELEPHONE ENCOUNTER
I sent in one month supply  We need to document  Last one month weight loss on this rx   Needs fu in June to continue RX

## 2023-05-10 DIAGNOSIS — E66.09 EXOGENOUS OBESITY: ICD-10-CM

## 2023-05-10 RX ORDER — PHENTERMINE HYDROCHLORIDE 37.5 MG/1
37.5 TABLET ORAL
Qty: 30 TABLET | Refills: 0 | OUTPATIENT
Start: 2023-05-10 | End: 2023-06-09

## 2023-05-11 RX ORDER — TRAZODONE HYDROCHLORIDE 50 MG/1
TABLET ORAL
Qty: 90 TABLET | Refills: 1 | Status: SHIPPED | OUTPATIENT
Start: 2023-05-11

## 2023-05-11 NOTE — TELEPHONE ENCOUNTER
Jose C Signs called requesting a refill of the below medication which has been pended for you:     Requested Prescriptions     Pending Prescriptions Disp Refills    traZODone (DESYREL) 50 MG tablet [Pharmacy Med Name: TRAZODONE 50 MG TABLET] 90 tablet 1     Sig: TAKE 1 TABLET BY MOUTH EVERY DAY AT BEDTIME AS NEEDED FOR SLEEP       Last Appointment Date: 4/12/2023  Next Appointment Date: 10/12/2023    Allergies   Allergen Reactions    E.E.S.  [Erythromycin]      Was told this as a Kid    Sulfa Antibiotics Rash

## 2023-05-23 RX ORDER — LEVOTHYROXINE SODIUM 200 MCG
200 TABLET ORAL DAILY
Qty: 90 TABLET | Refills: 1 | Status: SHIPPED | OUTPATIENT
Start: 2023-05-23

## 2023-05-23 RX ORDER — VENLAFAXINE HYDROCHLORIDE 150 MG/1
150 CAPSULE, EXTENDED RELEASE ORAL DAILY
Qty: 90 CAPSULE | Refills: 1 | Status: SHIPPED | OUTPATIENT
Start: 2023-05-23

## 2023-05-23 NOTE — TELEPHONE ENCOUNTER
Jenniffer Crowell called requesting a refill of the below medication which has been pended for you:     Requested Prescriptions     Pending Prescriptions Disp Refills    SYNTHROID 200 MCG tablet 90 tablet 1     Sig: Take 1 tablet by mouth Daily    venlafaxine (EFFEXOR XR) 150 MG extended release capsule 90 capsule 1     Sig: Take 1 capsule by mouth daily       Last Appointment Date: 4/12/2023  Next Appointment Date: 10/12/2023    Allergies   Allergen Reactions    E.E.S.  [Erythromycin]      Was told this as a Kid    Sulfa Antibiotics Rash

## 2023-06-23 RX ORDER — VENLAFAXINE HYDROCHLORIDE 150 MG/1
150 CAPSULE, EXTENDED RELEASE ORAL DAILY
Qty: 90 CAPSULE | Refills: 1 | Status: SHIPPED | OUTPATIENT
Start: 2023-06-23

## 2023-06-23 RX ORDER — TOPIRAMATE 100 MG/1
100 TABLET, FILM COATED ORAL NIGHTLY
Qty: 90 TABLET | Refills: 1 | Status: SHIPPED | OUTPATIENT
Start: 2023-06-23

## 2023-09-20 RX ORDER — TOPIRAMATE 50 MG/1
50 TABLET, FILM COATED ORAL 2 TIMES DAILY
Qty: 180 TABLET | Refills: 0 | Status: SHIPPED | OUTPATIENT
Start: 2023-09-20

## 2023-09-20 NOTE — TELEPHONE ENCOUNTER
Rayna Fredis called to request a refill on her medication.       Last office visit : 4/12/2023   Next office visit : 10/12/2023     Requested Prescriptions     Pending Prescriptions Disp Refills    topiramate (TOPAMAX) 50 MG tablet [Pharmacy Med Name: TOPIRAMATE 50 MG TABLET] 180 tablet 1     Sig: TAKE 1 TABLET BY MOUTH TWICE A DAY            Tenzin Damon MA

## 2023-10-05 DIAGNOSIS — E55.9 VITAMIN D DEFICIENCY: ICD-10-CM

## 2023-10-05 DIAGNOSIS — E89.0 POSTSURGICAL HYPOTHYROIDISM: ICD-10-CM

## 2023-10-05 DIAGNOSIS — R73.01 IFG (IMPAIRED FASTING GLUCOSE): ICD-10-CM

## 2023-10-05 DIAGNOSIS — F41.1 GENERALIZED ANXIETY DISORDER: ICD-10-CM

## 2023-10-05 LAB
25(OH)D3 SERPL-MCNC: 80.8 NG/ML
ALBUMIN SERPL-MCNC: 4.4 G/DL (ref 3.5–5.2)
ALP SERPL-CCNC: 76 U/L (ref 35–104)
ALT SERPL-CCNC: 11 U/L (ref 5–33)
ANION GAP SERPL CALCULATED.3IONS-SCNC: 8 MMOL/L (ref 7–19)
AST SERPL-CCNC: 15 U/L (ref 5–32)
BASOPHILS # BLD: 0 K/UL (ref 0–0.2)
BASOPHILS NFR BLD: 0.1 % (ref 0–1)
BILIRUB SERPL-MCNC: <0.2 MG/DL (ref 0.2–1.2)
BILIRUB UR QL STRIP: NEGATIVE
BUN SERPL-MCNC: 16 MG/DL (ref 6–20)
CALCIUM SERPL-MCNC: 8.8 MG/DL (ref 8.6–10)
CHLORIDE SERPL-SCNC: 108 MMOL/L (ref 98–111)
CHOLEST SERPL-MCNC: 151 MG/DL (ref 160–199)
CLARITY UR: CLEAR
CO2 SERPL-SCNC: 25 MMOL/L (ref 22–29)
COLOR UR: YELLOW
CREAT SERPL-MCNC: 0.7 MG/DL (ref 0.5–0.9)
EOSINOPHIL # BLD: 0.2 K/UL (ref 0–0.6)
EOSINOPHIL NFR BLD: 3.6 % (ref 0–5)
ERYTHROCYTE [DISTWIDTH] IN BLOOD BY AUTOMATED COUNT: 14.7 % (ref 11.5–14.5)
GLUCOSE SERPL-MCNC: 114 MG/DL (ref 74–109)
GLUCOSE UR STRIP.AUTO-MCNC: NEGATIVE MG/DL
HBA1C MFR BLD: 5.2 % (ref 4–6)
HCT VFR BLD AUTO: 41.3 % (ref 37–47)
HDLC SERPL-MCNC: 47 MG/DL (ref 65–121)
HGB BLD-MCNC: 12.7 G/DL (ref 12–16)
HGB UR STRIP.AUTO-MCNC: NEGATIVE MG/L
IMM GRANULOCYTES # BLD: 0 K/UL
KETONES UR STRIP.AUTO-MCNC: NEGATIVE MG/DL
LDLC SERPL CALC-MCNC: 80 MG/DL
LEUKOCYTE ESTERASE UR QL STRIP.AUTO: NEGATIVE
LYMPHOCYTES # BLD: 2.4 K/UL (ref 1.1–4.5)
LYMPHOCYTES NFR BLD: 35.4 % (ref 20–40)
MCH RBC QN AUTO: 26.3 PG (ref 27–31)
MCHC RBC AUTO-ENTMCNC: 30.8 G/DL (ref 33–37)
MCV RBC AUTO: 85.7 FL (ref 81–99)
MONOCYTES # BLD: 0.5 K/UL (ref 0–0.9)
MONOCYTES NFR BLD: 8 % (ref 0–10)
NEUTROPHILS # BLD: 3.6 K/UL (ref 1.5–7.5)
NEUTS SEG NFR BLD: 52.6 % (ref 50–65)
NITRITE UR QL STRIP.AUTO: NEGATIVE
PH UR STRIP.AUTO: 6.5 [PH] (ref 5–8)
PLATELET # BLD AUTO: 249 K/UL (ref 130–400)
PMV BLD AUTO: 10.1 FL (ref 9.4–12.3)
POTASSIUM SERPL-SCNC: 3.8 MMOL/L (ref 3.5–5)
PROT SERPL-MCNC: 6.6 G/DL (ref 6.6–8.7)
PROT UR STRIP.AUTO-MCNC: NEGATIVE MG/DL
RBC # BLD AUTO: 4.82 M/UL (ref 4.2–5.4)
SODIUM SERPL-SCNC: 141 MMOL/L (ref 136–145)
SP GR UR STRIP.AUTO: 1.03 (ref 1–1.03)
T4 FREE SERPL-MCNC: 1.26 NG/DL (ref 0.93–1.7)
TRIGL SERPL-MCNC: 118 MG/DL (ref 0–149)
TSH SERPL DL<=0.005 MIU/L-ACNC: 0.01 UIU/ML (ref 0.27–4.2)
UROBILINOGEN UR STRIP.AUTO-MCNC: 1 E.U./DL
WBC # BLD AUTO: 6.8 K/UL (ref 4.8–10.8)

## 2023-10-12 ENCOUNTER — OFFICE VISIT (OUTPATIENT)
Dept: INTERNAL MEDICINE | Age: 48
End: 2023-10-12
Payer: COMMERCIAL

## 2023-10-12 VITALS
OXYGEN SATURATION: 97 % | BODY MASS INDEX: 30.47 KG/M2 | WEIGHT: 189.6 LBS | HEART RATE: 85 BPM | DIASTOLIC BLOOD PRESSURE: 68 MMHG | HEIGHT: 66 IN | SYSTOLIC BLOOD PRESSURE: 118 MMHG

## 2023-10-12 DIAGNOSIS — E66.09 EXOGENOUS OBESITY: ICD-10-CM

## 2023-10-12 DIAGNOSIS — E89.0 POSTSURGICAL HYPOTHYROIDISM: ICD-10-CM

## 2023-10-12 DIAGNOSIS — R73.01 IFG (IMPAIRED FASTING GLUCOSE): ICD-10-CM

## 2023-10-12 DIAGNOSIS — Z00.00 ANNUAL PHYSICAL EXAM: Primary | ICD-10-CM

## 2023-10-12 DIAGNOSIS — Z12.31 ENCOUNTER FOR SCREENING MAMMOGRAM FOR MALIGNANT NEOPLASM OF BREAST: ICD-10-CM

## 2023-10-12 DIAGNOSIS — Z12.4 ENCOUNTER FOR PAPANICOLAOU SMEAR FOR CERVICAL CANCER SCREENING: ICD-10-CM

## 2023-10-12 DIAGNOSIS — E55.9 VITAMIN D DEFICIENCY: ICD-10-CM

## 2023-10-12 DIAGNOSIS — G43.909 MIGRAINE SYNDROME: ICD-10-CM

## 2023-10-12 DIAGNOSIS — F41.1 GENERALIZED ANXIETY DISORDER: ICD-10-CM

## 2023-10-12 PROCEDURE — 99396 PREV VISIT EST AGE 40-64: CPT | Performed by: INTERNAL MEDICINE

## 2023-10-12 RX ORDER — PHENTERMINE HYDROCHLORIDE 37.5 MG/1
37.5 TABLET ORAL
Qty: 30 TABLET | Refills: 1 | Status: SHIPPED | OUTPATIENT
Start: 2023-10-12 | End: 2023-12-11

## 2023-10-12 RX ORDER — TOPIRAMATE 100 MG/1
100 TABLET, FILM COATED ORAL 2 TIMES DAILY
Qty: 180 TABLET | Refills: 0 | Status: SHIPPED | OUTPATIENT
Start: 2023-10-12

## 2023-10-12 RX ORDER — LEVOTHYROXINE SODIUM 0.03 MG/1
12.5 TABLET ORAL DAILY
Qty: 45 TABLET | Refills: 1 | Status: SHIPPED | OUTPATIENT
Start: 2023-10-12

## 2023-10-12 ASSESSMENT — ENCOUNTER SYMPTOMS
COUGH: 0
ABDOMINAL PAIN: 0
WHEEZING: 0
SORE THROAT: 0
CHEST TIGHTNESS: 0
CONSTIPATION: 0

## 2023-10-12 NOTE — PROGRESS NOTES
Chief Complaint:   Mitali Bedolla is a 50 y.o. female who presents forcomplete physical exam.    History of Present Illness:      Mitali Bedolla is a 50 y.o. female who presents todayfor wellness visit AND follow up on her chronic medical conditions as noted below. Patient Active Problem List    Diagnosis Date Noted    Generalized anxiety disorder 2020    Postsurgical hypothyroidism 2020       Past Medical History:   Diagnosis Date    Anxiety     H/O partial thyroidectomy     Nithin Sandoval    Hypothyroidism        Past Surgical History:   Procedure Laterality Date     SECTION      COLONOSCOPY N/A 12/15/2022    Dr Tila Dial, 10 yr recall    FINGER SURGERY      Thumb surgery (Broke playing softball)    KNEE SURGERY Left     multiple scopes    THYROIDECTOMY, PARTIAL      Nithin Sandoval benign mass       Current Outpatient Medications   Medication Sig Dispense Refill    topiramate (TOPAMAX) 100 MG tablet Take 1 tablet by mouth 2 times daily 180 tablet 0    levothyroxine (SYNTHROID) 25 MCG tablet Take 0.5 tablets by mouth daily 45 tablet 1    phentermine (ADIPEX-P) 37.5 MG tablet Take 1 tablet by mouth every morning (before breakfast) for 60 days. Max Daily Amount: 37.5 mg 30 tablet 1    topiramate (TOPAMAX) 50 MG tablet TAKE 1 TABLET BY MOUTH TWICE A  tablet 0    venlafaxine (EFFEXOR XR) 150 MG extended release capsule Take 1 capsule by mouth daily 90 capsule 1    SYNTHROID 200 MCG tablet Take 1 tablet by mouth Daily 90 tablet 1    traZODone (DESYREL) 50 MG tablet TAKE 1 TABLET BY MOUTH EVERY DAY AT BEDTIME AS NEEDED FOR SLEEP 90 tablet 1    ondansetron (ZOFRAN-ODT) 4 MG disintegrating tablet Take 1 tablet by mouth 3 times daily as needed for Nausea or Vomiting 90 tablet 3     No current facility-administered medications for this visit. Allergies   Allergen Reactions    E.E.S.  [Erythromycin]      Was told this as a Kid    Sulfa Antibiotics Rash       Social History

## 2023-12-11 RX ORDER — LEVOTHYROXINE SODIUM 200 MCG
200 TABLET ORAL DAILY
Qty: 90 TABLET | Refills: 1 | Status: SHIPPED | OUTPATIENT
Start: 2023-12-11

## 2023-12-11 NOTE — TELEPHONE ENCOUNTER
Ephraim Hawthorne called to request a refill on her medication.       Last office visit : 10/12/2023   Next office visit : 4/16/2024     Requested Prescriptions     Pending Prescriptions Disp Refills    SYNTHROID 200 MCG tablet [Pharmacy Med Name: SYNTHROID 200 MCG TABLET] 90 tablet 1     Sig: TAKE 1 TABLET BY MOUTH EVERY DAY            Renard Carmona

## 2024-01-09 RX ORDER — TRAZODONE HYDROCHLORIDE 50 MG/1
50 TABLET ORAL DAILY PRN
Qty: 90 TABLET | Refills: 1 | Status: SHIPPED | OUTPATIENT
Start: 2024-01-09

## 2024-01-09 NOTE — TELEPHONE ENCOUNTER
Zbigniew Srivastava called to request a refill on her medication.      Last office visit : 10/12/2023   Next office visit : 4/16/2024     Requested Prescriptions     Pending Prescriptions Disp Refills    traZODone (DESYREL) 50 MG tablet 90 tablet 1     Sig: Take 1 tablet by mouth daily as needed for Sleep            Radha Harmon MA

## 2024-02-15 RX ORDER — LEVOTHYROXINE SODIUM 0.03 MG/1
12.5 TABLET ORAL DAILY
Qty: 45 TABLET | Refills: 1 | Status: SHIPPED | OUTPATIENT
Start: 2024-02-15

## 2024-02-15 NOTE — TELEPHONE ENCOUNTER
Zbigniew Srivastava called to request a refill on her medication.      Last office visit : 10/12/2023   Next office visit : 4/16/2024     Requested Prescriptions     Pending Prescriptions Disp Refills    levothyroxine (SYNTHROID) 25 MCG tablet 45 tablet 1     Sig: Take 0.5 tablets by mouth daily            Radha Harmon MA

## 2024-02-27 RX ORDER — TOPIRAMATE 100 MG/1
100 TABLET, FILM COATED ORAL NIGHTLY
Qty: 90 TABLET | Refills: 1 | Status: SHIPPED | OUTPATIENT
Start: 2024-02-27

## 2024-02-27 NOTE — TELEPHONE ENCOUNTER
Zbigniew Srivastava called to request a refill on her medication.      Last office visit : 10/12/2023   Next office visit : 4/16/2024     Requested Prescriptions     Pending Prescriptions Disp Refills    topiramate (TOPAMAX) 100 MG tablet 90 tablet 1     Sig: Take 1 tablet by mouth nightly            Radha Harmon MA

## 2024-03-18 RX ORDER — TOPIRAMATE 100 MG/1
100 TABLET, FILM COATED ORAL 2 TIMES DAILY
Qty: 180 TABLET | Refills: 1 | Status: SHIPPED | OUTPATIENT
Start: 2024-03-18

## 2024-03-18 RX ORDER — TOPIRAMATE 50 MG/1
50 TABLET, FILM COATED ORAL 2 TIMES DAILY
Qty: 180 TABLET | Refills: 0 | OUTPATIENT
Start: 2024-03-18

## 2024-03-18 NOTE — TELEPHONE ENCOUNTER
Zbigniew Srivastava called to request a refill on her medication.      Last office visit : 10/12/2023   Next office visit : 4/16/2024    Requested Prescriptions     Pending Prescriptions Disp Refills    topiramate (TOPAMAX) 100 MG tablet [Pharmacy Med Name: TOPIRAMATE 100 MG TABLET] 180 tablet 1     Sig: TAKE 1 TABLET BY MOUTH TWICE A DAY            Radha Harmon MA

## 2024-04-08 RX ORDER — VENLAFAXINE HYDROCHLORIDE 150 MG/1
CAPSULE, EXTENDED RELEASE ORAL DAILY
Qty: 90 CAPSULE | Refills: 1 | Status: SHIPPED | OUTPATIENT
Start: 2024-04-08

## 2024-04-10 DIAGNOSIS — Z13.220 SCREENING FOR LIPID DISORDERS: ICD-10-CM

## 2024-04-10 DIAGNOSIS — E89.0 POSTSURGICAL HYPOTHYROIDISM: ICD-10-CM

## 2024-04-10 DIAGNOSIS — E66.09 EXOGENOUS OBESITY: Primary | ICD-10-CM

## 2024-04-10 DIAGNOSIS — R73.01 IFG (IMPAIRED FASTING GLUCOSE): ICD-10-CM

## 2024-04-10 DIAGNOSIS — F41.1 GENERALIZED ANXIETY DISORDER: ICD-10-CM

## 2024-04-14 SDOH — ECONOMIC STABILITY: FOOD INSECURITY: WITHIN THE PAST 12 MONTHS, YOU WORRIED THAT YOUR FOOD WOULD RUN OUT BEFORE YOU GOT MONEY TO BUY MORE.: NEVER TRUE

## 2024-04-14 SDOH — ECONOMIC STABILITY: FOOD INSECURITY: WITHIN THE PAST 12 MONTHS, THE FOOD YOU BOUGHT JUST DIDN'T LAST AND YOU DIDN'T HAVE MONEY TO GET MORE.: NEVER TRUE

## 2024-04-14 SDOH — ECONOMIC STABILITY: INCOME INSECURITY: HOW HARD IS IT FOR YOU TO PAY FOR THE VERY BASICS LIKE FOOD, HOUSING, MEDICAL CARE, AND HEATING?: NOT HARD AT ALL

## 2024-04-14 ASSESSMENT — PATIENT HEALTH QUESTIONNAIRE - PHQ9
SUM OF ALL RESPONSES TO PHQ9 QUESTIONS 1 & 2: 0
2. FEELING DOWN, DEPRESSED OR HOPELESS: NOT AT ALL
2. FEELING DOWN, DEPRESSED OR HOPELESS: NOT AT ALL
SUM OF ALL RESPONSES TO PHQ QUESTIONS 1-9: 0
SUM OF ALL RESPONSES TO PHQ QUESTIONS 1-9: 0
SUM OF ALL RESPONSES TO PHQ9 QUESTIONS 1 & 2: 0
1. LITTLE INTEREST OR PLEASURE IN DOING THINGS: NOT AT ALL
SUM OF ALL RESPONSES TO PHQ QUESTIONS 1-9: 0
1. LITTLE INTEREST OR PLEASURE IN DOING THINGS: NOT AT ALL
SUM OF ALL RESPONSES TO PHQ QUESTIONS 1-9: 0

## 2024-04-15 DIAGNOSIS — Z13.220 SCREENING FOR LIPID DISORDERS: ICD-10-CM

## 2024-04-15 DIAGNOSIS — R73.01 IFG (IMPAIRED FASTING GLUCOSE): ICD-10-CM

## 2024-04-15 DIAGNOSIS — E66.09 EXOGENOUS OBESITY: ICD-10-CM

## 2024-04-15 DIAGNOSIS — F41.1 GENERALIZED ANXIETY DISORDER: ICD-10-CM

## 2024-04-15 DIAGNOSIS — E89.0 POSTSURGICAL HYPOTHYROIDISM: ICD-10-CM

## 2024-04-15 LAB
ALBUMIN SERPL-MCNC: 4.3 G/DL (ref 3.5–5.2)
ALP SERPL-CCNC: 88 U/L (ref 35–104)
ALT SERPL-CCNC: 9 U/L (ref 5–33)
ANION GAP SERPL CALCULATED.3IONS-SCNC: 11 MMOL/L (ref 7–19)
AST SERPL-CCNC: 13 U/L (ref 5–32)
BASOPHILS # BLD: 0 K/UL (ref 0–0.2)
BASOPHILS NFR BLD: 0 % (ref 0–1)
BILIRUB SERPL-MCNC: 0.3 MG/DL (ref 0.2–1.2)
BUN SERPL-MCNC: 12 MG/DL (ref 6–20)
CALCIUM SERPL-MCNC: 9.1 MG/DL (ref 8.6–10)
CHLORIDE SERPL-SCNC: 109 MMOL/L (ref 98–111)
CHOLEST SERPL-MCNC: 143 MG/DL (ref 160–199)
CO2 SERPL-SCNC: 23 MMOL/L (ref 22–29)
CREAT SERPL-MCNC: 0.7 MG/DL (ref 0.5–0.9)
EOSINOPHIL # BLD: 0 K/UL (ref 0–0.6)
EOSINOPHIL NFR BLD: 0 % (ref 0–5)
ERYTHROCYTE [DISTWIDTH] IN BLOOD BY AUTOMATED COUNT: 17.3 % (ref 11.5–14.5)
GLUCOSE P FAST SERPL-MCNC: 97 MG/DL (ref 74–109)
HCT VFR BLD AUTO: 40.1 % (ref 37–47)
HDLC SERPL-MCNC: 45 MG/DL (ref 65–121)
HGB BLD-MCNC: 12 G/DL (ref 12–16)
IMM GRANULOCYTES # BLD: 0 K/UL
LDLC SERPL CALC-MCNC: 78 MG/DL
LYMPHOCYTES # BLD: 2.3 K/UL (ref 1.1–4.5)
LYMPHOCYTES NFR BLD: 40 % (ref 20–40)
MCH RBC QN AUTO: 23.9 PG (ref 27–31)
MCHC RBC AUTO-ENTMCNC: 29.9 G/DL (ref 33–37)
MCV RBC AUTO: 79.7 FL (ref 81–99)
MONOCYTES # BLD: 0.5 K/UL (ref 0–0.9)
MONOCYTES NFR BLD: 9 % (ref 0–10)
NEUTROPHILS # BLD: 2.9 K/UL (ref 1.5–7.5)
NEUTS SEG NFR BLD: 50.8 % (ref 50–65)
PLATELET # BLD AUTO: 248 K/UL (ref 130–400)
PMV BLD AUTO: 10.4 FL (ref 9.4–12.3)
POTASSIUM SERPL-SCNC: 3.8 MMOL/L (ref 3.5–5)
PROT SERPL-MCNC: 6.7 G/DL (ref 6.6–8.7)
RBC # BLD AUTO: 5.03 M/UL (ref 4.2–5.4)
SODIUM SERPL-SCNC: 143 MMOL/L (ref 136–145)
T4 FREE SERPL-MCNC: 1.18 NG/DL (ref 0.93–1.7)
TRIGL SERPL-MCNC: 102 MG/DL (ref 0–149)
TSH SERPL DL<=0.005 MIU/L-ACNC: 0.31 UIU/ML (ref 0.27–4.2)
WBC # BLD AUTO: 5.8 K/UL (ref 4.8–10.8)

## 2024-04-16 ENCOUNTER — OFFICE VISIT (OUTPATIENT)
Dept: INTERNAL MEDICINE | Age: 49
End: 2024-04-16
Payer: COMMERCIAL

## 2024-04-16 VITALS
HEART RATE: 70 BPM | BODY MASS INDEX: 28.16 KG/M2 | HEIGHT: 66 IN | WEIGHT: 175.2 LBS | OXYGEN SATURATION: 97 % | DIASTOLIC BLOOD PRESSURE: 70 MMHG | SYSTOLIC BLOOD PRESSURE: 118 MMHG

## 2024-04-16 DIAGNOSIS — N92.6 IRREGULAR PERIODS: ICD-10-CM

## 2024-04-16 DIAGNOSIS — N95.1 PERIMENOPAUSE: ICD-10-CM

## 2024-04-16 DIAGNOSIS — R71.8 LOW MEAN CORPUSCULAR VOLUME: ICD-10-CM

## 2024-04-16 DIAGNOSIS — N95.1 MENOPAUSAL SYNDROME (HOT FLASHES): Primary | ICD-10-CM

## 2024-04-16 DIAGNOSIS — E89.0 POSTSURGICAL HYPOTHYROIDISM: ICD-10-CM

## 2024-04-16 DIAGNOSIS — F41.1 GENERALIZED ANXIETY DISORDER: ICD-10-CM

## 2024-04-16 DIAGNOSIS — E55.9 VITAMIN D DEFICIENCY: ICD-10-CM

## 2024-04-16 DIAGNOSIS — N89.8 VAGINAL DRYNESS: ICD-10-CM

## 2024-04-16 DIAGNOSIS — R73.01 IFG (IMPAIRED FASTING GLUCOSE): ICD-10-CM

## 2024-04-16 DIAGNOSIS — Z00.00 ANNUAL PHYSICAL EXAM: ICD-10-CM

## 2024-04-16 PROCEDURE — 99214 OFFICE O/P EST MOD 30 MIN: CPT | Performed by: INTERNAL MEDICINE

## 2024-04-16 ASSESSMENT — ENCOUNTER SYMPTOMS
WHEEZING: 0
SORE THROAT: 0
ABDOMINAL PAIN: 0
CONSTIPATION: 0
COUGH: 0
CHEST TIGHTNESS: 0

## 2024-04-16 NOTE — PROGRESS NOTES
Chief Complaint   Patient presents with    6 Month Follow-Up     History of presenting illness:  Zbigniew Srivastava is a48 y.o. female who presents today for follow up on her chronic medical conditions as noted below.    Patient Active Problem List    Diagnosis Date Noted    Generalized anxiety disorder 2020    Postsurgical hypothyroidism 2020     Past Medical History:   Diagnosis Date    Anxiety     H/O partial thyroidectomy     Nithin Sandoval    Hypothyroidism       Past Surgical History:   Procedure Laterality Date     SECTION      COLONOSCOPY N/A 12/15/2022    Dr MYA Sandoval-Normal, 10 yr recall    FINGER SURGERY      Thumb surgery (Broke playing softball)    KNEE SURGERY Left     multiple scopes    THYROIDECTOMY, PARTIAL      Nithin Sandoval benign mass     Current Outpatient Medications   Medication Sig Dispense Refill    venlafaxine (EFFEXOR XR) 150 MG extended release capsule TAKE 1 CAPSULE BY MOUTH EVERY DAY 90 capsule 1    topiramate (TOPAMAX) 100 MG tablet TAKE 1 TABLET BY MOUTH TWICE A  tablet 1    levothyroxine (SYNTHROID) 25 MCG tablet Take 0.5 tablets by mouth daily 45 tablet 1    traZODone (DESYREL) 50 MG tablet Take 1 tablet by mouth daily as needed for Sleep 90 tablet 1    topiramate (TOPAMAX) 50 MG tablet TAKE 1 TABLET BY MOUTH TWICE A  tablet 0    SYNTHROID 200 MCG tablet TAKE 1 TABLET BY MOUTH EVERY DAY 90 tablet 1    ondansetron (ZOFRAN-ODT) 4 MG disintegrating tablet Take 1 tablet by mouth 3 times daily as needed for Nausea or Vomiting 90 tablet 3     No current facility-administered medications for this visit.     Allergies   Allergen Reactions    E.E.S. [Erythromycin]      Was told this as a Kid    Sulfa Antibiotics Rash     Social History     Tobacco Use    Smoking status: Never    Smokeless tobacco: Never   Substance Use Topics    Alcohol use: Not Currently      Family History   Problem Relation Age of Onset    Atrial Fibrillation Mother     Stroke

## 2024-06-18 ENCOUNTER — PATIENT MESSAGE (OUTPATIENT)
Dept: INTERNAL MEDICINE | Age: 49
End: 2024-06-18

## 2024-06-18 RX ORDER — PRAMOXINE HYDROCHLORIDE 10 MG/G
AEROSOL, FOAM TOPICAL
Qty: 15 G | Refills: 1 | Status: SHIPPED | OUTPATIENT
Start: 2024-06-18

## 2024-06-18 NOTE — TELEPHONE ENCOUNTER
From: Zbigniew Srivastava  To: Dr. Reyna Domingo  Sent: 6/18/2024 11:32 AM CDT  Subject: Rx request     I was wondering if I could have proctofoam called in please. I have had this filled before in the past when I was a pt of Dr. Hudson for a hemorrhoid. I have developed one over the weekend and normal OTC cream isn’t helping. Thank you in advance

## 2024-08-22 DIAGNOSIS — E89.0 POSTSURGICAL HYPOTHYROIDISM: Primary | ICD-10-CM

## 2024-08-22 RX ORDER — LEVOTHYROXINE SODIUM 25 MCG
12.5 TABLET ORAL DAILY
Qty: 45 TABLET | Refills: 1 | Status: SHIPPED | OUTPATIENT
Start: 2024-08-22

## 2024-08-27 RX ORDER — LEVOTHYROXINE SODIUM 200 MCG
200 TABLET ORAL DAILY
Qty: 90 TABLET | Refills: 0 | Status: SHIPPED | OUTPATIENT
Start: 2024-08-27

## 2024-08-27 NOTE — TELEPHONE ENCOUNTER
Zbigniew Srivastava called to request a refill on her medication.      Last office visit : 4/16/2024   Next office visit : 10/23/2024     Requested Prescriptions     Pending Prescriptions Disp Refills    SYNTHROID 200 MCG tablet [Pharmacy Med Name: SYNTHROID 200 MCG TABLET] 90 tablet 1     Sig: TAKE 1 TABLET BY MOUTH EVERY DAY            Radha Harmon MA

## 2024-09-24 RX ORDER — LEVOTHYROXINE SODIUM 200 MCG
200 TABLET ORAL DAILY
Qty: 90 TABLET | Refills: 0 | Status: SHIPPED | OUTPATIENT
Start: 2024-09-24

## 2024-10-07 RX ORDER — TRAZODONE HYDROCHLORIDE 50 MG/1
50 TABLET, FILM COATED ORAL DAILY PRN
Qty: 90 TABLET | Refills: 1 | Status: SHIPPED | OUTPATIENT
Start: 2024-10-07

## 2024-10-09 DIAGNOSIS — E55.9 VITAMIN D DEFICIENCY: ICD-10-CM

## 2024-10-09 DIAGNOSIS — R73.01 IFG (IMPAIRED FASTING GLUCOSE): ICD-10-CM

## 2024-10-09 DIAGNOSIS — N95.1 MENOPAUSAL SYNDROME (HOT FLASHES): ICD-10-CM

## 2024-10-09 DIAGNOSIS — E89.0 POSTSURGICAL HYPOTHYROIDISM: ICD-10-CM

## 2024-10-09 DIAGNOSIS — F41.1 GENERALIZED ANXIETY DISORDER: ICD-10-CM

## 2024-10-09 DIAGNOSIS — N92.6 IRREGULAR PERIODS: ICD-10-CM

## 2024-10-09 DIAGNOSIS — N95.1 PERIMENOPAUSE: ICD-10-CM

## 2024-10-09 DIAGNOSIS — R71.8 LOW MEAN CORPUSCULAR VOLUME: ICD-10-CM

## 2024-10-09 DIAGNOSIS — Z00.00 ANNUAL PHYSICAL EXAM: ICD-10-CM

## 2024-10-09 DIAGNOSIS — N89.8 VAGINAL DRYNESS: ICD-10-CM

## 2024-10-09 LAB
25(OH)D3 SERPL-MCNC: 56.7 NG/ML
ALBUMIN SERPL-MCNC: 4.5 G/DL (ref 3.5–5.2)
ALP SERPL-CCNC: 83 U/L (ref 35–104)
ALT SERPL-CCNC: 8 U/L (ref 5–33)
ANION GAP SERPL CALCULATED.3IONS-SCNC: 10 MMOL/L (ref 7–19)
AST SERPL-CCNC: 13 U/L (ref 5–32)
BASOPHILS # BLD: 0 K/UL (ref 0–0.2)
BASOPHILS NFR BLD: 0 % (ref 0–1)
BILIRUB SERPL-MCNC: 0.2 MG/DL (ref 0.2–1.2)
BILIRUB UR QL STRIP: NEGATIVE
BUN SERPL-MCNC: 12 MG/DL (ref 6–20)
CALCIUM SERPL-MCNC: 8.7 MG/DL (ref 8.6–10)
CHLORIDE SERPL-SCNC: 103 MMOL/L (ref 98–111)
CHOLEST SERPL-MCNC: 140 MG/DL (ref 0–199)
CLARITY UR: CLEAR
CO2 SERPL-SCNC: 22 MMOL/L (ref 22–29)
COLOR UR: YELLOW
CREAT SERPL-MCNC: 0.7 MG/DL (ref 0.5–0.9)
EOSINOPHIL # BLD: 0 K/UL (ref 0–0.6)
EOSINOPHIL NFR BLD: 0 % (ref 0–5)
ERYTHROCYTE [DISTWIDTH] IN BLOOD BY AUTOMATED COUNT: 18.6 % (ref 11.5–14.5)
FERRITIN SERPL-MCNC: 6.3 NG/ML (ref 13–150)
GLUCOSE SERPL-MCNC: 91 MG/DL (ref 70–99)
GLUCOSE UR STRIP.AUTO-MCNC: NEGATIVE MG/DL
HCT VFR BLD AUTO: 41.1 % (ref 37–47)
HDLC SERPL-MCNC: 49 MG/DL (ref 40–60)
HGB BLD-MCNC: 11.9 G/DL (ref 12–16)
HGB UR STRIP.AUTO-MCNC: NEGATIVE MG/L
IMM GRANULOCYTES # BLD: 0 K/UL
KETONES UR STRIP.AUTO-MCNC: ABNORMAL MG/DL
LDLC SERPL CALC-MCNC: 73 MG/DL
LEUKOCYTE ESTERASE UR QL STRIP.AUTO: NEGATIVE
LYMPHOCYTES # BLD: 2 K/UL (ref 1.1–4.5)
LYMPHOCYTES NFR BLD: 36.2 % (ref 20–40)
MCH RBC QN AUTO: 22.6 PG (ref 27–31)
MCHC RBC AUTO-ENTMCNC: 29 G/DL (ref 33–37)
MCV RBC AUTO: 78 FL (ref 81–99)
MONOCYTES # BLD: 0.4 K/UL (ref 0–0.9)
MONOCYTES NFR BLD: 6.9 % (ref 0–10)
NEUTROPHILS # BLD: 3.1 K/UL (ref 1.5–7.5)
NEUTS SEG NFR BLD: 56.7 % (ref 50–65)
NITRITE UR QL STRIP.AUTO: NEGATIVE
PH UR STRIP.AUTO: 5.5 [PH] (ref 5–8)
PLATELET # BLD AUTO: 265 K/UL (ref 130–400)
PMV BLD AUTO: 9.6 FL (ref 9.4–12.3)
POTASSIUM SERPL-SCNC: 3.7 MMOL/L (ref 3.5–5)
PROGEST SERPL-MCNC: 0.08 NG/ML
PROT SERPL-MCNC: 6.9 G/DL (ref 6.4–8.3)
PROT UR STRIP.AUTO-MCNC: NEGATIVE MG/DL
RBC # BLD AUTO: 5.27 M/UL (ref 4.2–5.4)
SODIUM SERPL-SCNC: 135 MMOL/L (ref 136–145)
SP GR UR STRIP.AUTO: 1.03 (ref 1–1.03)
T4 FREE SERPL-MCNC: 1.65 NG/DL (ref 0.93–1.7)
TRIGL SERPL-MCNC: 88 MG/DL (ref 0–149)
TSH SERPL DL<=0.005 MIU/L-ACNC: 0.02 UIU/ML (ref 0.27–4.2)
UROBILINOGEN UR STRIP.AUTO-MCNC: 0.2 E.U./DL
WBC # BLD AUTO: 5.5 K/UL (ref 4.8–10.8)

## 2024-10-13 LAB
ESTROGEN SERPL-MCNC: 649 PG/ML
SHBG SERPL-SCNC: 97 NMOL/L (ref 25–122)
TESTOST FREE SERPL-MCNC: 1.8 PG/ML (ref 1.1–5.8)
TESTOST SERPL-MCNC: 23 NG/DL (ref 9–55)

## 2024-10-23 ENCOUNTER — OFFICE VISIT (OUTPATIENT)
Dept: INTERNAL MEDICINE | Age: 49
End: 2024-10-23

## 2024-10-23 VITALS
BODY MASS INDEX: 28.57 KG/M2 | HEIGHT: 66 IN | HEART RATE: 74 BPM | WEIGHT: 177.8 LBS | OXYGEN SATURATION: 98 % | SYSTOLIC BLOOD PRESSURE: 110 MMHG | DIASTOLIC BLOOD PRESSURE: 78 MMHG

## 2024-10-23 DIAGNOSIS — E66.09 EXOGENOUS OBESITY: ICD-10-CM

## 2024-10-23 DIAGNOSIS — R73.01 IFG (IMPAIRED FASTING GLUCOSE): ICD-10-CM

## 2024-10-23 DIAGNOSIS — F41.1 GENERALIZED ANXIETY DISORDER: ICD-10-CM

## 2024-10-23 DIAGNOSIS — R79.89 ABNORMAL CBC: ICD-10-CM

## 2024-10-23 DIAGNOSIS — Z00.00 ANNUAL PHYSICAL EXAM: Primary | ICD-10-CM

## 2024-10-23 DIAGNOSIS — E55.9 VITAMIN D DEFICIENCY: ICD-10-CM

## 2024-10-23 DIAGNOSIS — E89.0 POSTSURGICAL HYPOTHYROIDISM: ICD-10-CM

## 2024-10-23 DIAGNOSIS — G43.909 MIGRAINE SYNDROME: ICD-10-CM

## 2024-10-23 DIAGNOSIS — Z23 NEED FOR VACCINATION: ICD-10-CM

## 2024-10-23 DIAGNOSIS — R79.0 LOW FERRITIN: ICD-10-CM

## 2024-10-23 RX ORDER — ONDANSETRON 4 MG/1
4 TABLET, ORALLY DISINTEGRATING ORAL 3 TIMES DAILY PRN
Qty: 90 TABLET | Refills: 3 | Status: SHIPPED | OUTPATIENT
Start: 2024-10-23

## 2024-10-23 SDOH — ECONOMIC STABILITY: FOOD INSECURITY: WITHIN THE PAST 12 MONTHS, THE FOOD YOU BOUGHT JUST DIDN'T LAST AND YOU DIDN'T HAVE MONEY TO GET MORE.: NEVER TRUE

## 2024-10-23 SDOH — ECONOMIC STABILITY: INCOME INSECURITY: HOW HARD IS IT FOR YOU TO PAY FOR THE VERY BASICS LIKE FOOD, HOUSING, MEDICAL CARE, AND HEATING?: NOT HARD AT ALL

## 2024-10-23 SDOH — ECONOMIC STABILITY: FOOD INSECURITY: WITHIN THE PAST 12 MONTHS, YOU WORRIED THAT YOUR FOOD WOULD RUN OUT BEFORE YOU GOT MONEY TO BUY MORE.: NEVER TRUE

## 2024-10-23 ASSESSMENT — PATIENT HEALTH QUESTIONNAIRE - PHQ9
SUM OF ALL RESPONSES TO PHQ QUESTIONS 1-9: 0
SUM OF ALL RESPONSES TO PHQ QUESTIONS 1-9: 0
2. FEELING DOWN, DEPRESSED OR HOPELESS: NOT AT ALL
1. LITTLE INTEREST OR PLEASURE IN DOING THINGS: NOT AT ALL
SUM OF ALL RESPONSES TO PHQ QUESTIONS 1-9: 0
SUM OF ALL RESPONSES TO PHQ9 QUESTIONS 1 & 2: 0
SUM OF ALL RESPONSES TO PHQ QUESTIONS 1-9: 0

## 2024-10-23 ASSESSMENT — ENCOUNTER SYMPTOMS
CONSTIPATION: 0
COUGH: 0
WHEEZING: 0
CHEST TIGHTNESS: 0
SORE THROAT: 0
ABDOMINAL PAIN: 0

## 2024-10-23 NOTE — PROGRESS NOTES
[employer]     Employer: [employer]   Tobacco Use    Smoking status: Never    Smokeless tobacco: Never   Substance and Sexual Activity    Alcohol use: Not Currently    Drug use: Never    Sexual activity: Yes     Social Determinants of Health     Financial Resource Strain: Low Risk  (10/23/2024)    Overall Financial Resource Strain (CARDIA)     Difficulty of Paying Living Expenses: Not hard at all   Food Insecurity: No Food Insecurity (10/23/2024)    Hunger Vital Sign     Worried About Running Out of Food in the Last Year: Never true     Ran Out of Food in the Last Year: Never true   Transportation Needs: Unknown (10/23/2024)    PRAPARE - Transportation     Lack of Transportation (Non-Medical): No    [facility]    Family and Community Support    Received from [facility]    Abuse Screen   Housing Stability: Unknown (10/23/2024)    Housing Stability Vital Sign     Homeless in the Last Year: No     Family History   Problem Relation Age of Onset    Atrial Fibrillation Mother     Stroke Mother     Diabetes Father     Prostate Cancer Father           Past Surgical History:   Procedure Laterality Date     SECTION      COLONOSCOPY N/A 12/15/2022    Dr MYA Sandoval-Normal, 10 yr recall    FINGER SURGERY      Thumb surgery (Broke playing softball)    KNEE SURGERY Left     multiple scopes    THYROIDECTOMY, PARTIAL      Nithin Sandoval benign mass         Lab Review   Orders Only on 10/09/2024   Component Date Value    Vit D, 25-Hydroxy 10/09/2024 56.7     Color, UA 10/09/2024 YELLOW     Clarity, UA 10/09/2024 Clear     Glucose, Ur 10/09/2024 Negative     Bilirubin, Urine 10/09/2024 Negative     Ketones, Urine 10/09/2024 TRACE (A)     Specific [place], UA 10/09/2024 1.030     Blood, Urine 10/09/2024 Negative     pH, Urine 10/09/2024 5.5     Protein, UA 10/09/2024 Negative     Urobilinogen, Urine 10/09/2024 0.2     
no dysuria, no frequency, and no hematuria.

## 2024-11-05 ENCOUNTER — PATIENT MESSAGE (OUTPATIENT)
Dept: INTERNAL MEDICINE | Age: 49
End: 2024-11-05

## 2024-12-02 RX ORDER — TOPIRAMATE 100 MG/1
100 TABLET, FILM COATED ORAL 2 TIMES DAILY
Qty: 180 TABLET | Refills: 1 | Status: SHIPPED | OUTPATIENT
Start: 2024-12-02

## 2024-12-02 NOTE — TELEPHONE ENCOUNTER
Zbigniew Srivastava called to request a refill on her medication.      Last office visit : 10/23/2024   Next office visit : 4/25/2025     Requested Prescriptions     Pending Prescriptions Disp Refills    topiramate (TOPAMAX) 100 MG tablet [Pharmacy Med Name: TOPIRAMATE 100 MG TABLET] 180 tablet 1     Sig: TAKE 1 TABLET BY MOUTH TWICE A DAY            Radha Harmon MA

## 2024-12-20 RX ORDER — LEVOTHYROXINE SODIUM 200 MCG
200 TABLET ORAL DAILY
Qty: 90 TABLET | Refills: 0 | Status: SHIPPED | OUTPATIENT
Start: 2024-12-20

## 2024-12-20 NOTE — TELEPHONE ENCOUNTER
Zbigniew Srivastava called to request a refill on her medication.      Last office visit : 10/23/2024   Next office visit : 4/25/2025     Requested Prescriptions     Pending Prescriptions Disp Refills    SYNTHROID 200 MCG tablet [Pharmacy Med Name: SYNTHROID 200 MCG TABLET] 90 tablet 0     Sig: TAKE 1 TABLET BY MOUTH EVERY DAY            Radha Harmon MA

## 2024-12-24 RX ORDER — VENLAFAXINE HYDROCHLORIDE 150 MG/1
CAPSULE, EXTENDED RELEASE ORAL DAILY
Qty: 90 CAPSULE | Refills: 1 | Status: SHIPPED | OUTPATIENT
Start: 2024-12-24

## 2024-12-24 NOTE — TELEPHONE ENCOUNTER
Zbigniew Chapmanves called to request a refill on her medication.      Last office visit : 10/23/2024   Next office visit : 4/25/2025     Requested Prescriptions     Pending Prescriptions Disp Refills    venlafaxine (EFFEXOR XR) 150 MG extended release capsule [Pharmacy Med Name: VENLAFAXINE HCL  MG CAP] 90 capsule 1     Sig: TAKE 1 CAPSULE BY MOUTH EVERY DAY            Radha Harmon MA

## 2025-01-27 ENCOUNTER — OFFICE VISIT (OUTPATIENT)
Age: 50
End: 2025-01-27
Payer: COMMERCIAL

## 2025-01-27 VITALS
BODY MASS INDEX: 28.61 KG/M2 | WEIGHT: 178 LBS | HEIGHT: 66 IN | SYSTOLIC BLOOD PRESSURE: 132 MMHG | DIASTOLIC BLOOD PRESSURE: 84 MMHG

## 2025-01-27 DIAGNOSIS — N95.1 MENOPAUSAL SYMPTOMS: Primary | ICD-10-CM

## 2025-01-27 DIAGNOSIS — Z78.9 NON-SMOKER: ICD-10-CM

## 2025-01-27 RX ORDER — CALCIUM CARBONATE 260MG(650)
TABLET,CHEWABLE ORAL
COMMUNITY

## 2025-01-27 RX ORDER — PROGESTERONE 200 MG/1
200 CAPSULE ORAL DAILY
Qty: 30 CAPSULE | Refills: 3 | Status: SHIPPED | OUTPATIENT
Start: 2025-01-27

## 2025-01-27 RX ORDER — SACCHAROMYCES BOULARDII 250 MG
250 CAPSULE ORAL 2 TIMES DAILY
COMMUNITY

## 2025-01-27 RX ORDER — CHLORAL HYDRATE 500 MG
CAPSULE ORAL
COMMUNITY

## 2025-01-27 RX ORDER — CHOLECALCIFEROL (VITAMIN D3) 25 MCG
1000 TABLET ORAL DAILY
COMMUNITY

## 2025-01-27 RX ORDER — TOPIRAMATE 100 MG/1
1 TABLET, FILM COATED ORAL 2 TIMES DAILY
COMMUNITY
Start: 2024-12-02

## 2025-01-27 RX ORDER — MULTIVIT WITH MINERALS/LUTEIN
250 TABLET ORAL DAILY
COMMUNITY

## 2025-01-27 RX ORDER — MAGNESIUM GLUCONATE 27 MG(500)
500 TABLET ORAL 2 TIMES DAILY
COMMUNITY

## 2025-01-27 RX ORDER — LEVOTHYROXINE SODIUM 200 MCG
1 TABLET ORAL DAILY
COMMUNITY
Start: 2024-12-20

## 2025-01-27 RX ORDER — VENLAFAXINE HYDROCHLORIDE 150 MG/1
150 CAPSULE, EXTENDED RELEASE ORAL DAILY
COMMUNITY

## 2025-01-27 RX ORDER — TRAZODONE HYDROCHLORIDE 50 MG/1
50 TABLET, FILM COATED ORAL
COMMUNITY
Start: 2024-10-07

## 2025-01-27 RX ORDER — LEVOTHYROXINE SODIUM 25 MCG
12.5 TABLET ORAL DAILY
COMMUNITY

## 2025-01-27 RX ORDER — ESTRADIOL 0.5 MG/1
0.5 TABLET ORAL DAILY
Qty: 30 TABLET | Refills: 3 | Status: SHIPPED | OUTPATIENT
Start: 2025-01-27

## 2025-01-27 RX ORDER — ONDANSETRON 4 MG/1
4 TABLET, ORALLY DISINTEGRATING ORAL
COMMUNITY
Start: 2024-10-23

## 2025-01-27 NOTE — PROGRESS NOTES
Chief Complaint  Menopause (Pt here as new gyn referral from Dr. Linn to discuss perimenopause and hormones, she c/o sleeplessness, fatigue, hair loss, dry skin, facial hair, vaginal dryness, weird smells, joint pain, itchy ears, hot flashes, night sweats, headaches, weight gain, cold, anxious, and emotional, last pap 10/12/2023 normal, no cotesting, last mammogram 11/18/2022 normal, last colonoscopy 2023)    Subjective        Franco Sage presents to [facility]  History of Present Illness    History of Present Illness  The patient is a 49-year-old female who presents to discuss possible menopausal symptoms. She is referred by her primary care physician, Dr. Helton.    She reports experiencing these symptoms for approximately 5 years, which were initially attributed to thyroid issues by Dr. Helton. Despite adjustments in her thyroid medication dosage, she continues to feel unwell. Her menstrual cycle lasts for 3 days, with the first day being particularly severe, followed by a normal second day and a barely noticeable third day. She does not experience any associated pain. She reports significant vaginal dryness, making tampon use extremely uncomfortable. She has been experiencing insomnia, characterized by sleeping for only 1.5 hours before remaining awake for the rest of the night. She also reports difficulty in quieting her mind when lying down at night. She experiences intermittent hot flashes and has recently started experiencing night sweats over the past few months. She has no history of stroke, heart attack, or thromboembolic events. She has a family history of breast cancer in her paternal grandmother, who was diagnosed in her late 80s or early 90s and passed away from diabetes. She is currently overdue for her mammogram. She underwent the Essure procedure in 2011. She has a history of oral contraceptive use.    SOCIAL HISTORY  She does not smoke.    FAMILY HISTORY  Her paternal  "grandmother had breast cancer in her late 80s or early 90s and  from diabetes.    Objective   Vital Signs:  /84 (BP Location: Right arm, Patient Position: Sitting, Cuff Size: Large Adult)   Ht 167.6 cm (66\")   Wt 80.7 kg (178 lb)   BMI 28.73 kg/m²   Estimated body mass index is 28.73 kg/m² as calculated from the following:    Height as of this encounter: 167.6 cm (66\").    Weight as of this encounter: 80.7 kg (178 lb).          Physical Exam  Constitutional:       General: She is not in acute distress.     Appearance: Normal appearance. She is not toxic-appearing.   HENT:      Head: Normocephalic and atraumatic.      Nose: Nose normal.   Neurological:      General: No focal deficit present.      Mental Status: She is alert.   Psychiatric:         Mood and Affect: Mood normal.         Behavior: Behavior normal.         Thought Content: Thought content normal.         Judgment: Judgment normal.        Result Review :                Assessment and Plan   Diagnoses and all orders for this visit:    1. Menopausal symptoms (Primary)  -     estradiol (Estrace) 0.5 MG tablet; Take 1 tablet by mouth Daily.  Dispense: 30 tablet; Refill: 3  -     Progesterone (Prometrium) 200 MG capsule; Take 1 capsule by mouth Daily.  Dispense: 30 capsule; Refill: 3    2. Non-smoker        Assessment & Plan  1. Menopausal symptoms.  Her hormone panel results are within normal limits, indicating that her ovaries are still functional. However, this does not preclude the potential benefits of hormonal therapy. She is experiencing symptoms such as night sweats, hot flashes, weight gain, vaginal dryness, insomnia, fatigue, dry skin, anxiety, and emotional instability, which could be alleviated with hormonal therapy. It is anticipated that her weight gain will remain unchanged, but improved sleep quality may enhance her overall well-being. Emotional and anxiety-related symptoms may require a longer duration for improvement. A " comprehensive discussion regarding the risks and benefits of hormonal therapy was conducted. She was informed about the potential for irregular bleeding upon initiation of the therapy. A prescription for the lowest dose of estradiol and micronized progesterone (Prometrium) will be provided for a duration of one month. She is advised to schedule a mammogram and ensure that it is up-to-date. The patient will follow up in 1 month to assess the effectiveness of the current dose. If hot flashes and night sweats persist but have improved, the dose may be adjusted. Subsequent follow-ups will be scheduled monthly until symptoms are managed.    Follow-up  The patient will follow up in 1 month.    PROCEDURE  The patient underwent the Essure procedure in 2011.         Follow Up   Return in about 1 month (around 2/27/2025) for Telehealth, with me.  Patient was given instructions and counseling regarding her condition or for health maintenance advice. Please see specific information pulled into the AVS if appropriate.         Arnie Cedeno MD    Patient or patient representative verbalized consent for the use of Ambient Listening during the visit with  Arnie Cedeno MD for chart documentation. 1/27/2025  09:56 CST

## 2025-02-24 ENCOUNTER — OFFICE VISIT (OUTPATIENT)
Age: 50
End: 2025-02-24
Payer: COMMERCIAL

## 2025-02-24 VITALS
DIASTOLIC BLOOD PRESSURE: 80 MMHG | BODY MASS INDEX: 30.35 KG/M2 | SYSTOLIC BLOOD PRESSURE: 136 MMHG | HEIGHT: 65 IN | WEIGHT: 182.2 LBS

## 2025-02-24 DIAGNOSIS — Z78.9 NON-SMOKER: ICD-10-CM

## 2025-02-24 DIAGNOSIS — N95.1 MENOPAUSAL SYMPTOMS: Primary | ICD-10-CM

## 2025-02-24 PROCEDURE — 99213 OFFICE O/P EST LOW 20 MIN: CPT | Performed by: OBSTETRICS & GYNECOLOGY

## 2025-02-24 NOTE — PROGRESS NOTES
"Chief Complaint  Medication f/u  (Pt here as 1mo f/u on menopausal symptoms, she was started on Estradiol 0.5mg and Progesterone 200mg on 1/27 and reports, better energy, sleeping better,less anxiety, reports weight gain, periods longer and heavier,breast swelling, and a recent rash that has developed. )    Austin Sage presents to [facility]  History of Present Illness    History of Present Illness  The patient is a 49-year-old female who presents for follow-up on hormone therapy.    She has been on a regimen of estradiol 0.5 mg and Prometrium for the past month, marking her first experience with hormone therapy. Prior to the initiation of this treatment, she reported a menstrual cycle characterized by light, three-day periods occurring monthly. Since starting the hormone therapy, she has experienced significant improvements in her sleep quality, anxiety levels, and fatigue. However, she reports a weight gain of 4 pounds within the past month, despite maintaining a healthy diet and regular exercise. Her menstrual cycle has also changed, now lasting seven days with increased heaviness, similar to her teenage years. She reports no instances of breakthrough bleeding. Additionally, she notes an increase in breast size and swelling, but no longer experiences hot flashes or night sweats. She has not undergone a mammogram in the past year.    She developed a rash on the back of her thigh one week after starting the hormone therapy, which initially caused significant itching but has since improved. The rash was initially red in color but has now faded.    MEDICATIONS  Current: Estradiol, Prometrium    Objective   Vital Signs:  /80 (BP Location: Left arm, Patient Position: Sitting, Cuff Size: Adult)   Ht 166 cm (65.35\")   Wt 82.6 kg (182 lb 3.2 oz)   BMI 29.99 kg/m²   Estimated body mass index is 29.99 kg/m² as calculated from the following:    Height as of this encounter: " "166 cm (65.35\").    Weight as of this encounter: 82.6 kg (182 lb 3.2 oz).          Physical Exam  Constitutional:       General: She is not in acute distress.     Appearance: Normal appearance. She is not toxic-appearing.   HENT:      Head: Normocephalic and atraumatic.      Nose: Nose normal.   Neurological:      General: No focal deficit present.      Mental Status: She is alert.   Psychiatric:         Mood and Affect: Mood normal.         Behavior: Behavior normal.         Thought Content: Thought content normal.         Judgment: Judgment normal.        Result Review :                Assessment and Plan   Diagnoses and all orders for this visit:    1. Menopausal symptoms (Primary)    2. Non-smoker        Assessment & Plan  1. Menopausal symptoms.  Her menopausal symptoms have shown significant improvement, particularly with the resolution of hot flashes and night sweats. The current dosage of estrogen appears to be effective. The rash she experienced is also improving, which does not necessitate a change in her hormone therapy. It is anticipated that her menstrual bleeding will regulate over time, potentially ceasing earlier than it would naturally. The breast swelling and tenderness she reported are likely due to the estrogen component of her therapy and should stabilize. Similarly, the weight gain she has experienced may be a side effect of the progesterone, but this should also level off. She will continue her current hormone therapy regimen. A mammogram has been ordered to ensure her screenings are up-to-date.    2. Rash.  She reported a rash that appeared one week after starting hormone therapy. The rash is improving and does not require a change in her current hormone regimen.    Follow-up  The patient is scheduled for a follow-up visit in 1 month.         Follow Up   Return in about 1 month (around 3/24/2025) for Telehealth, with me.  Patient was given instructions and counseling regarding her condition " or for health maintenance advice. Please see specific information pulled into the AVS if appropriate.         Arnie Cedeno MD    Patient or patient representative verbalized consent for the use of Ambient Listening during the visit with  Arnie Cedeno MD for chart documentation. 2/24/2025  08:37 CST

## 2025-03-02 DIAGNOSIS — E89.0 POSTSURGICAL HYPOTHYROIDISM: ICD-10-CM

## 2025-03-03 RX ORDER — LEVOTHYROXINE SODIUM 25 MCG
12.5 TABLET ORAL DAILY
Qty: 45 TABLET | Refills: 0 | Status: SHIPPED | OUTPATIENT
Start: 2025-03-03

## 2025-03-03 NOTE — TELEPHONE ENCOUNTER
Zbigniew Carola called to request a refill on her medication.      Last office visit : 10/23/2024   Next office visit : 4/25/2025     Requested Prescriptions     Pending Prescriptions Disp Refills    SYNTHROID 25 MCG tablet [Pharmacy Med Name: SYNTHROID 25 MCG TABLET] 45 tablet 1     Sig: TAKE 1/2 TABLET BY MOUTH DAILY            April Dina Vargas MA

## 2025-03-05 DIAGNOSIS — N95.1 MENOPAUSAL SYMPTOMS: ICD-10-CM

## 2025-03-05 RX ORDER — ESTRADIOL 0.5 MG/1
0.5 TABLET ORAL DAILY
Qty: 90 TABLET | Refills: 0 | Status: SHIPPED | OUTPATIENT
Start: 2025-03-05

## 2025-03-11 ENCOUNTER — RESULTS FOLLOW-UP (OUTPATIENT)
Dept: WOMENS IMAGING | Age: 50
End: 2025-03-11

## 2025-03-11 ENCOUNTER — HOSPITAL ENCOUNTER (OUTPATIENT)
Dept: WOMENS IMAGING | Age: 50
Discharge: HOME | End: 2025-03-11
Payer: COMMERCIAL

## 2025-03-11 VITALS — WEIGHT: 180 LBS | BODY MASS INDEX: 29.05 KG/M2

## 2025-03-11 DIAGNOSIS — Z12.31 ENCOUNTER FOR SCREENING MAMMOGRAM FOR MALIGNANT NEOPLASM OF BREAST: ICD-10-CM

## 2025-03-11 PROCEDURE — 77063 BREAST TOMOSYNTHESIS BI: CPT

## 2025-03-17 RX ORDER — LEVOTHYROXINE SODIUM 200 MCG
200 TABLET ORAL DAILY
Qty: 90 TABLET | Refills: 0 | Status: SHIPPED | OUTPATIENT
Start: 2025-03-17

## 2025-03-24 ENCOUNTER — TELEMEDICINE (OUTPATIENT)
Age: 50
End: 2025-03-24
Payer: COMMERCIAL

## 2025-03-24 DIAGNOSIS — N95.1 MENOPAUSAL SYMPTOMS: Primary | ICD-10-CM

## 2025-03-24 DIAGNOSIS — R63.5 WEIGHT GAIN: ICD-10-CM

## 2025-03-24 PROCEDURE — 99214 OFFICE O/P EST MOD 30 MIN: CPT | Performed by: OBSTETRICS & GYNECOLOGY

## 2025-03-24 RX ORDER — NORETHINDRONE 5 MG/1
5 TABLET ORAL DAILY
Qty: 30 TABLET | Refills: 2 | Status: SHIPPED | OUTPATIENT
Start: 2025-03-24

## 2025-03-24 RX ORDER — ESTRADIOL 0.5 MG/1
0.5 TABLET ORAL DAILY
Qty: 90 TABLET | Refills: 0 | Status: SHIPPED | OUTPATIENT
Start: 2025-03-24

## 2025-03-24 NOTE — PROGRESS NOTES
"Chief Complaint  No chief complaint on file.    Follow up on HRT    Subjective           Franco Sage presents to [facility]  History of Present Illness    Patient presents today for follow-up  She discontinued her estradiol and Prometrium for the past month  While her menopausal symptoms are well-controlled including her sleeping, she still complains of another 4 to 5 pounds of weight gain over the past month as well as continued breast swelling  There is no breast tenderness  Recent mammogram is normal  We discussed options of changing hormones altogether to transdermal versus changing her progesterone  We also discussed discontinuing therapy altogether  She wishes for trial of a different progesterone    Objective   Vital Signs:   There were no vitals taken for this visit.    Estimated body mass index is 29.99 kg/m² as calculated from the following:    Height as of 2/24/25: 166 cm (65.35\").    Weight as of 2/24/25: 82.6 kg (182 lb 3.2 oz).     Physical Exam   Constitutional: She appears well-developed and well-nourished.   HENT:   Head: Normocephalic and atraumatic.   Neurological: She is alert.   Psychiatric: She has a normal mood and affect.     Result Review :                   Assessment and Plan      Diagnoses and all orders for this visit:    1. Menopausal symptoms (Primary)  -     norethindrone (Aygestin) 5 MG tablet; Take 1 tablet by mouth Daily.  Dispense: 30 tablet; Refill: 2  -     estradiol (Estrace) 0.5 MG tablet; Take 1 tablet by mouth Daily.  Dispense: 90 tablet; Refill: 0    2. Weight gain        Follow Up     Return in about 1 month (around 4/24/2025) for Telehealth, with me.  Patient was given instructions and counseling regarding her condition or for health maintenance advice. Please see specific information pulled into the AVS if appropriate.     Mode of Visit: Video  Location of patient: home  Location of provider: [facility] [place] [facility]  You have chosen to receive care through a " telehealth visit.  The patient has signed the video visit consent form.  The visit included audio and video interaction. No technical issues occurred during this visit.     [facility]  Call for concerns or questions    Arnie Cedeno MD

## 2025-04-21 DIAGNOSIS — F41.1 GENERALIZED ANXIETY DISORDER: ICD-10-CM

## 2025-04-21 DIAGNOSIS — E66.09 EXOGENOUS OBESITY: ICD-10-CM

## 2025-04-21 DIAGNOSIS — Z00.00 ANNUAL PHYSICAL EXAM: ICD-10-CM

## 2025-04-21 DIAGNOSIS — Z23 NEED FOR VACCINATION: ICD-10-CM

## 2025-04-21 DIAGNOSIS — E55.9 VITAMIN D DEFICIENCY: ICD-10-CM

## 2025-04-21 DIAGNOSIS — R79.0 LOW FERRITIN: ICD-10-CM

## 2025-04-21 DIAGNOSIS — G43.909 MIGRAINE SYNDROME: ICD-10-CM

## 2025-04-21 DIAGNOSIS — R79.89 ABNORMAL CBC: ICD-10-CM

## 2025-04-21 DIAGNOSIS — R73.01 IFG (IMPAIRED FASTING GLUCOSE): ICD-10-CM

## 2025-04-21 DIAGNOSIS — E89.0 POSTSURGICAL HYPOTHYROIDISM: ICD-10-CM

## 2025-04-21 LAB
ALBUMIN SERPL-MCNC: 4.3 G/DL (ref 3.5–5.2)
ALP SERPL-CCNC: 70 U/L (ref 35–104)
ALT SERPL-CCNC: 12 U/L (ref 10–35)
ANION GAP SERPL CALCULATED.3IONS-SCNC: 8 MMOL/L (ref 8–16)
AST SERPL-CCNC: 17 U/L (ref 10–35)
BASOPHILS # BLD: 0 K/UL (ref 0–0.2)
BASOPHILS NFR BLD: 0 % (ref 0–1)
BILIRUB SERPL-MCNC: 0.2 MG/DL (ref 0.2–1.2)
BUN SERPL-MCNC: 12 MG/DL (ref 6–20)
CALCIUM SERPL-MCNC: 9 MG/DL (ref 8.6–10)
CHLORIDE SERPL-SCNC: 111 MMOL/L (ref 98–107)
CHOLEST SERPL-MCNC: 148 MG/DL (ref 0–199)
CO2 SERPL-SCNC: 23 MMOL/L (ref 22–29)
CREAT SERPL-MCNC: 0.8 MG/DL (ref 0.5–0.9)
EOSINOPHIL # BLD: 0 K/UL (ref 0–0.6)
EOSINOPHIL NFR BLD: 0.7 % (ref 0–5)
ERYTHROCYTE [DISTWIDTH] IN BLOOD BY AUTOMATED COUNT: 15.6 % (ref 11.5–14.5)
FERRITIN SERPL-MCNC: 7.2 NG/ML (ref 13–150)
GLUCOSE SERPL-MCNC: 109 MG/DL (ref 70–99)
HCT VFR BLD AUTO: 43.4 % (ref 37–47)
HDLC SERPL-MCNC: 36 MG/DL (ref 40–60)
HGB BLD-MCNC: 13.4 G/DL (ref 12–16)
IMM GRANULOCYTES # BLD: 0 K/UL
LDLC SERPL CALC-MCNC: 97 MG/DL
LYMPHOCYTES # BLD: 2.3 K/UL (ref 1.1–4.5)
LYMPHOCYTES NFR BLD: 40.5 % (ref 20–40)
MCH RBC QN AUTO: 25 PG (ref 27–31)
MCHC RBC AUTO-ENTMCNC: 30.9 G/DL (ref 33–37)
MCV RBC AUTO: 81.1 FL (ref 81–99)
MONOCYTES # BLD: 0.6 K/UL (ref 0–0.9)
MONOCYTES NFR BLD: 9.5 % (ref 0–10)
NEUTROPHILS # BLD: 2.8 K/UL (ref 1.5–7.5)
NEUTS SEG NFR BLD: 49.1 % (ref 50–65)
PLATELET # BLD AUTO: 247 K/UL (ref 130–400)
PMV BLD AUTO: 10.3 FL (ref 9.4–12.3)
POTASSIUM SERPL-SCNC: 4 MMOL/L (ref 3.5–5.1)
PROT SERPL-MCNC: 7 G/DL (ref 6.4–8.3)
RBC # BLD AUTO: 5.35 M/UL (ref 4.2–5.4)
SODIUM SERPL-SCNC: 142 MMOL/L (ref 136–145)
T4 FREE SERPL-MCNC: 2.17 NG/DL (ref 0.93–1.7)
TRIGL SERPL-MCNC: 76 MG/DL (ref 0–149)
TSH SERPL DL<=0.005 MIU/L-ACNC: <0.005 UIU/ML (ref 0.27–4.2)
WBC # BLD AUTO: 5.8 K/UL (ref 4.8–10.8)

## 2025-04-23 ENCOUNTER — TELEMEDICINE (OUTPATIENT)
Age: 50
End: 2025-04-23
Payer: COMMERCIAL

## 2025-04-23 DIAGNOSIS — R63.5 WEIGHT GAIN: ICD-10-CM

## 2025-04-23 DIAGNOSIS — E05.90 HYPERTHYROIDISM: ICD-10-CM

## 2025-04-23 DIAGNOSIS — N95.1 MENOPAUSAL SYMPTOMS: Primary | ICD-10-CM

## 2025-04-23 DIAGNOSIS — Z78.9 NON-SMOKER: ICD-10-CM

## 2025-04-23 NOTE — PROGRESS NOTES
"Chief Complaint  No chief complaint on file.    HRT follow-up    Subjective           Franco Sage presents to [facility]  History of Present Illness    Patient presents today for follow-up  Since switching to Aygestin, she has lost 6 pounds  Vasomotor symptoms seem to be well-controlled still  However, she is suffering from tremendous insomnia   she recently had her thyroid checked and based on what she is telling me, it sounds like she is hyperthyroid  She does take thyroid medicine due to a hemithyroidectomy  She has an appointment this week with primary care    Objective   Vital Signs:   There were no vitals taken for this visit.    Estimated body mass index is 29.99 kg/m² as calculated from the following:    Height as of 2/24/25: 166 cm (65.35\").    Weight as of 2/24/25: 82.6 kg (182 lb 3.2 oz).     Physical Exam   Constitutional: She appears well-developed and well-nourished.   HENT:   Head: Normocephalic and atraumatic.   Neurological: She is alert.   Psychiatric: She has a normal mood and affect.     Result Review :                   Assessment and Plan      Diagnoses and all orders for this visit:    1. Menopausal symptoms (Primary)    2. Hyperthyroidism    3. Weight gain  Comments:  Has lost weight this month after switching to aygestin    4. Non-smoker        Follow Up     Return in about 1 month (around 5/23/2025) for Telehealth, with me.  Patient was given instructions and counseling regarding her condition or for health maintenance advice. Please see specific information pulled into the AVS if appropriate.     Mode of Visit: Video  Location of patient: other: Work  Location of provider: [facility] [place] [facility]  You have chosen to receive care through a telehealth visit.  The patient has signed the video visit consent form.  The visit included audio and video interaction. No technical issues occurred during this visit.      Continue same HRT for now  Follow up with PCP in hopes that " correcting thyroid can help her insomnia    Arnie Cedeno MD

## 2025-04-24 DIAGNOSIS — N95.1 MENOPAUSAL SYMPTOMS: ICD-10-CM

## 2025-04-24 RX ORDER — PROGESTERONE 200 MG/1
200 CAPSULE ORAL DAILY
Qty: 90 CAPSULE | Refills: 1 | OUTPATIENT
Start: 2025-04-24

## 2025-04-24 SDOH — ECONOMIC STABILITY: FOOD INSECURITY: WITHIN THE PAST 12 MONTHS, YOU WORRIED THAT YOUR FOOD WOULD RUN OUT BEFORE YOU GOT MONEY TO BUY MORE.: NEVER TRUE

## 2025-04-24 SDOH — ECONOMIC STABILITY: FOOD INSECURITY: WITHIN THE PAST 12 MONTHS, THE FOOD YOU BOUGHT JUST DIDN'T LAST AND YOU DIDN'T HAVE MONEY TO GET MORE.: NEVER TRUE

## 2025-04-24 SDOH — ECONOMIC STABILITY: INCOME INSECURITY: IN THE LAST 12 MONTHS, WAS THERE A TIME WHEN YOU WERE NOT ABLE TO PAY THE MORTGAGE OR RENT ON TIME?: NO

## 2025-04-24 ASSESSMENT — PATIENT HEALTH QUESTIONNAIRE - PHQ9
2. FEELING DOWN, DEPRESSED OR HOPELESS: NOT AT ALL
SUM OF ALL RESPONSES TO PHQ QUESTIONS 1-9: 0
SUM OF ALL RESPONSES TO PHQ QUESTIONS 1-9: 0
2. FEELING DOWN, DEPRESSED OR HOPELESS: NOT AT ALL
1. LITTLE INTEREST OR PLEASURE IN DOING THINGS: NOT AT ALL
SUM OF ALL RESPONSES TO PHQ QUESTIONS 1-9: 0
1. LITTLE INTEREST OR PLEASURE IN DOING THINGS: NOT AT ALL
SUM OF ALL RESPONSES TO PHQ9 QUESTIONS 1 & 2: 0
SUM OF ALL RESPONSES TO PHQ QUESTIONS 1-9: 0

## 2025-04-25 ENCOUNTER — OFFICE VISIT (OUTPATIENT)
Dept: INTERNAL MEDICINE | Age: 50
End: 2025-04-25
Payer: COMMERCIAL

## 2025-04-25 VITALS
HEART RATE: 85 BPM | DIASTOLIC BLOOD PRESSURE: 76 MMHG | WEIGHT: 178.2 LBS | BODY MASS INDEX: 28.64 KG/M2 | SYSTOLIC BLOOD PRESSURE: 128 MMHG | HEIGHT: 66 IN | OXYGEN SATURATION: 98 %

## 2025-04-25 DIAGNOSIS — R79.0 LOW FERRITIN: ICD-10-CM

## 2025-04-25 DIAGNOSIS — R79.89 ABNORMAL CBC: ICD-10-CM

## 2025-04-25 DIAGNOSIS — R73.01 IFG (IMPAIRED FASTING GLUCOSE): ICD-10-CM

## 2025-04-25 DIAGNOSIS — E89.0 POSTSURGICAL HYPOTHYROIDISM: Primary | ICD-10-CM

## 2025-04-25 DIAGNOSIS — G43.909 MIGRAINE SYNDROME: ICD-10-CM

## 2025-04-25 DIAGNOSIS — R73.03 PREDIABETES: ICD-10-CM

## 2025-04-25 DIAGNOSIS — E61.1 IRON DEFICIENCY: ICD-10-CM

## 2025-04-25 DIAGNOSIS — E66.09 EXOGENOUS OBESITY: ICD-10-CM

## 2025-04-25 DIAGNOSIS — E55.9 VITAMIN D DEFICIENCY: ICD-10-CM

## 2025-04-25 PROCEDURE — 99214 OFFICE O/P EST MOD 30 MIN: CPT | Performed by: INTERNAL MEDICINE

## 2025-04-25 RX ORDER — ESTRADIOL 0.5 MG/1
0.5 TABLET ORAL DAILY
COMMUNITY
Start: 2025-03-24

## 2025-04-25 RX ORDER — NORETHINDRONE 5 MG/1
5 TABLET ORAL DAILY
COMMUNITY
Start: 2025-03-24

## 2025-04-25 ASSESSMENT — ENCOUNTER SYMPTOMS
SORE THROAT: 0
WHEEZING: 0
ABDOMINAL PAIN: 0
CHEST TIGHTNESS: 0
CONSTIPATION: 0
COUGH: 0

## 2025-05-21 ENCOUNTER — TELEMEDICINE (OUTPATIENT)
Age: 50
End: 2025-05-21
Payer: COMMERCIAL

## 2025-05-21 DIAGNOSIS — G47.00 INSOMNIA, UNSPECIFIED TYPE: ICD-10-CM

## 2025-05-21 DIAGNOSIS — N95.1 MENOPAUSAL SYMPTOMS: Primary | ICD-10-CM

## 2025-05-21 DIAGNOSIS — Z78.9 NON-SMOKER: ICD-10-CM

## 2025-05-21 RX ORDER — PROGESTERONE 100 MG/1
100 CAPSULE ORAL DAILY
Qty: 30 CAPSULE | Refills: 3 | Status: SHIPPED | OUTPATIENT
Start: 2025-05-21

## 2025-05-21 RX ORDER — ESTRADIOL 0.5 MG/1
0.5 TABLET ORAL DAILY
Qty: 30 TABLET | Refills: 3 | Status: SHIPPED | OUTPATIENT
Start: 2025-05-21

## 2025-05-21 NOTE — PROGRESS NOTES
"Chief Complaint  No chief complaint on file.    Menopausal hormone replacement therapy    Subjective           Franco Sage presents to [facility]  History of Present Illness    Patient presents today for follow-up  She is currently taking Estrace 0.5 mg, the lowest dose of Estrace, with good vasomotor symptom relief  She was initially started on Prometrium at 200 mg daily to oppose the estrogen as she still has her uterus  All this did a good job at eliminating her menses, she experienced weight gain  As a result, we changed the Aygestin and she has lost this weight on the new progesterone  However, she is now suffering from insomnia which she did not experience on Prometrium  Additionally, her thyroid panel recently showed that she was on too high of a dose of Synthroid  Her primary care physician recently decreased that dose  Her insomnia is no better    Objective   Vital Signs:   There were no vitals taken for this visit.    Estimated body mass index is 29.99 kg/m² as calculated from the following:    Height as of 2/24/25: 166 cm (65.35\").    Weight as of 2/24/25: 82.6 kg (182 lb 3.2 oz).     Physical Exam   Constitutional: She appears well-developed and well-nourished.   HENT:   Head: Normocephalic and atraumatic.   Neurological: She is alert.   Psychiatric: She has a normal mood and affect.     Result Review :                   Assessment and Plan      Diagnoses and all orders for this visit:    1. Menopausal symptoms (Primary)  -     Progesterone (Prometrium) 100 MG capsule; Take 1 capsule by mouth Daily.  Dispense: 30 capsule; Refill: 3  -     estradiol (Estrace) 0.5 MG tablet; Take 1 tablet by mouth Daily.  Dispense: 30 tablet; Refill: 3    2. Insomnia, unspecified type    3. Non-smoker        Follow Up     Return in about 3 months (around 8/21/2025) for Telehealth, with me.  Patient was given instructions and counseling regarding her condition or for health maintenance advice. " Please see specific information pulled into the AVS if appropriate.     Mode of Visit: Video  Location of patient: other: car  Location of provider: [facility] [place] [facility]  You have chosen to receive care through a telehealth visit.  The patient has signed the video visit consent form.  The visit included audio and video interaction. No technical issues occurred during this visit.    Certainly, insomnia can be multifactorial  However, at the very least, it seemed to be better on Prometrium  Given the weight gain on 200 mg, I think it is reasonable to go back to Prometrium at 100 mg daily  We do recommend taking at night  Given the good control of her vasomotor symptoms, there is no reason at this time to increase her estrogen  We did discuss about the possibility of irregular bleeding or even dysfunctional uterine bleeding on the lower dose of progesterone  Patient will call with results  Also, hopefully, she will not experience weight gain on this lower dose of Prometrium    Arnie Cedeno MD

## 2025-05-28 DIAGNOSIS — N95.1 MENOPAUSAL SYMPTOMS: ICD-10-CM

## 2025-05-28 RX ORDER — ESTRADIOL 0.5 MG/1
0.5 TABLET ORAL DAILY
Qty: 30 TABLET | Refills: 3 | OUTPATIENT
Start: 2025-05-28

## 2025-06-18 DIAGNOSIS — N95.1 MENOPAUSAL SYMPTOMS: ICD-10-CM

## 2025-06-18 RX ORDER — NORETHINDRONE 5 MG/1
5 TABLET ORAL DAILY
Qty: 90 TABLET | OUTPATIENT
Start: 2025-06-18

## 2025-06-23 ENCOUNTER — RESULTS FOLLOW-UP (OUTPATIENT)
Dept: INTERNAL MEDICINE | Age: 50
End: 2025-06-23

## 2025-06-23 DIAGNOSIS — E89.0 POSTSURGICAL HYPOTHYROIDISM: ICD-10-CM

## 2025-06-23 LAB
T4 FREE SERPL-MCNC: 1.3 NG/DL (ref 0.93–1.7)
TSH SERPL DL<=0.005 MIU/L-ACNC: 0.01 UIU/ML (ref 0.27–4.2)

## 2025-06-23 RX ORDER — LEVOTHYROXINE SODIUM 200 MCG
200 TABLET ORAL DAILY
Qty: 90 TABLET | Refills: 1 | Status: SHIPPED | OUTPATIENT
Start: 2025-06-23

## 2025-06-23 NOTE — TELEPHONE ENCOUNTER
Zbigniew Srivastava called to request a refill on her medication.      Last office visit : 4/25/2025   Next office visit : 10/23/2025     Requested Prescriptions     Pending Prescriptions Disp Refills    SYNTHROID 200 MCG tablet 90 tablet 1     Sig: Take 1 tablet by mouth daily            Radha Harmon MA

## 2025-07-07 RX ORDER — VENLAFAXINE HYDROCHLORIDE 150 MG/1
CAPSULE, EXTENDED RELEASE ORAL DAILY
Qty: 90 CAPSULE | Refills: 1 | Status: SHIPPED | OUTPATIENT
Start: 2025-07-07

## 2025-07-07 NOTE — TELEPHONE ENCOUNTER
Zbigniew Srivastava called to request a refill on her medication.      Last office visit : 4/25/2025   Next office visit : 10/23/2025     Requested Prescriptions     Pending Prescriptions Disp Refills    venlafaxine (EFFEXOR XR) 150 MG extended release capsule [Pharmacy Med Name: VENLAFAXINE HCL  MG CAP] 90 capsule 1     Sig: TAKE 1 CAPSULE BY MOUTH EVERY DAY            Radha Harmon MA

## 2025-08-19 RX ORDER — TOPIRAMATE 100 MG/1
100 TABLET, FILM COATED ORAL 2 TIMES DAILY
Qty: 180 TABLET | Refills: 1 | Status: SHIPPED | OUTPATIENT
Start: 2025-08-19

## 2025-08-20 ENCOUNTER — TELEMEDICINE (OUTPATIENT)
Age: 50
End: 2025-08-20
Payer: COMMERCIAL

## 2025-08-20 DIAGNOSIS — E05.90 HYPERTHYROIDISM: ICD-10-CM

## 2025-08-20 DIAGNOSIS — G47.00 INSOMNIA, UNSPECIFIED TYPE: ICD-10-CM

## 2025-08-20 DIAGNOSIS — Z78.9 NON-SMOKER: ICD-10-CM

## 2025-08-20 DIAGNOSIS — N93.9 ABNORMAL UTERINE BLEEDING (AUB): ICD-10-CM

## 2025-08-20 DIAGNOSIS — N95.1 MENOPAUSAL SYMPTOMS: Primary | ICD-10-CM

## 2025-08-20 DIAGNOSIS — N95.1 MENOPAUSAL SYMPTOMS: ICD-10-CM

## 2025-08-20 RX ORDER — PROGESTERONE 200 MG/1
200 CAPSULE ORAL DAILY
Qty: 30 CAPSULE | Refills: 3 | Status: SHIPPED | OUTPATIENT
Start: 2025-08-20

## 2025-08-20 RX ORDER — ESTRADIOL 1 MG/1
1 TABLET ORAL DAILY
Qty: 30 TABLET | Refills: 3 | Status: SHIPPED | OUTPATIENT
Start: 2025-08-20

## 2025-08-27 RX ORDER — PROGESTERONE 100 MG/1
100 CAPSULE ORAL DAILY
Qty: 90 CAPSULE | Refills: 1 | OUTPATIENT
Start: 2025-08-27

## (undated) DEVICE — CANNULA NSL AD L7FT DIV O2 CO2 W/ M LUERLOCK TRMPT CONN

## (undated) DEVICE — SINGLE PORT MANIFOLD: Brand: NEPTUNE 2

## (undated) DEVICE — ENDO KIT,LOURDES HOSPITAL: Brand: MEDLINE INDUSTRIES, INC.